# Patient Record
Sex: MALE
[De-identification: names, ages, dates, MRNs, and addresses within clinical notes are randomized per-mention and may not be internally consistent; named-entity substitution may affect disease eponyms.]

---

## 2018-03-16 ENCOUNTER — HOSPITAL ENCOUNTER (INPATIENT)
Dept: HOSPITAL 31 - C.ER | Age: 33
LOS: 4 days | Discharge: HOME | DRG: 750 | End: 2018-03-20
Attending: HOSPITALIST | Admitting: HOSPITALIST
Payer: COMMERCIAL

## 2018-03-16 DIAGNOSIS — D69.6: ICD-10-CM

## 2018-03-16 DIAGNOSIS — R31.29: ICD-10-CM

## 2018-03-16 DIAGNOSIS — K76.0: ICD-10-CM

## 2018-03-16 DIAGNOSIS — M62.82: ICD-10-CM

## 2018-03-16 DIAGNOSIS — G47.00: ICD-10-CM
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
## 2018-03-16 DIAGNOSIS — F10.221: Primary | ICD-10-CM

## 2018-03-16 DIAGNOSIS — F33.1: ICD-10-CM

## 2018-03-16 DIAGNOSIS — E78.5: ICD-10-CM

## 2018-03-16 DIAGNOSIS — E66.9: ICD-10-CM

## 2018-03-16 DIAGNOSIS — F10.231: ICD-10-CM

## 2018-03-17 LAB
ALBUMIN SERPL-MCNC: 4.7 G/DL (ref 3.5–5)
ALBUMIN/GLOB SERPL: 1.2 {RATIO} (ref 1–2.1)
ALT SERPL-CCNC: 287 U/L (ref 21–72)
APAP SERPL-MCNC: < 10 UG/ML (ref 10–30)
AST SERPL-CCNC: 676 U/L (ref 17–59)
BASOPHILS # BLD AUTO: 0 K/UL (ref 0–0.2)
BASOPHILS NFR BLD: 0.2 % (ref 0–2)
BILIRUB UR-MCNC: NEGATIVE MG/DL
BUN SERPL-MCNC: 11 MG/DL (ref 9–20)
CALCIUM SERPL-MCNC: 8.3 MG/DL (ref 8.6–10.4)
EOSINOPHIL # BLD AUTO: 0 K/UL (ref 0–0.7)
EOSINOPHIL NFR BLD: 0 % (ref 0–4)
ERYTHROCYTE [DISTWIDTH] IN BLOOD BY AUTOMATED COUNT: 14.1 % (ref 11.5–14.5)
GFR NON-AFRICAN AMERICAN: > 60
GLUCOSE UR STRIP-MCNC: NORMAL MG/DL
HDLC SERPL-MCNC: 84 MG/DL (ref 30–70)
HGB BLD-MCNC: 14.9 G/DL (ref 12–18)
LDLC SERPL-MCNC: 222 MG/DL (ref 0–129)
LEUKOCYTE ESTERASE UR-ACNC: (no result) LEU/UL
LYMPHOCYTES # BLD AUTO: 1.3 K/UL (ref 1–4.3)
LYMPHOCYTES NFR BLD AUTO: 10.6 % (ref 20–40)
MCH RBC QN AUTO: 30.7 PG (ref 27–31)
MCHC RBC AUTO-ENTMCNC: 34.3 G/DL (ref 33–37)
MCV RBC AUTO: 89.5 FL (ref 80–94)
MONOCYTES # BLD: 0.6 K/UL (ref 0–0.8)
MONOCYTES NFR BLD: 5.2 % (ref 0–10)
NEUTROPHILS # BLD: 10.1 K/UL (ref 1.8–7)
NEUTROPHILS NFR BLD AUTO: 84 % (ref 50–75)
NRBC BLD AUTO-RTO: 0.1 % (ref 0–2)
PH UR STRIP: 6 [PH] (ref 5–8)
PLATELET # BLD: 167 K/UL (ref 130–400)
PMV BLD AUTO: 10 FL (ref 7.2–11.7)
PROT UR STRIP-MCNC: (no result) MG/DL
RBC # BLD AUTO: 4.85 MIL/UL (ref 4.4–5.9)
RBC # UR STRIP: (no result) /UL
SALICYLATE: < 1 MG/DL 1
SP GR UR STRIP: 1.01 (ref 1–1.03)
UROBILINOGEN UR-MCNC: 4 MG/DL (ref 0.2–1)
WBC # BLD AUTO: 12.1 K/UL (ref 4.8–10.8)

## 2018-03-17 PROCEDURE — HZ56ZZZ INDIVIDUAL PSYCHOTHERAPY FOR SUBSTANCE ABUSE TREATMENT, PSYCHOEDUCATION: ICD-10-PCS | Performed by: PSYCHIATRY & NEUROLOGY

## 2018-03-17 PROCEDURE — GZ3ZZZZ MEDICATION MANAGEMENT: ICD-10-PCS | Performed by: PSYCHIATRY & NEUROLOGY

## 2018-03-17 PROCEDURE — HZ2ZZZZ DETOXIFICATION SERVICES FOR SUBSTANCE ABUSE TREATMENT: ICD-10-PCS | Performed by: PSYCHIATRY & NEUROLOGY

## 2018-03-17 PROCEDURE — HZ59ZZZ INDIVIDUAL PSYCHOTHERAPY FOR SUBSTANCE ABUSE TREATMENT, SUPPORTIVE: ICD-10-PCS | Performed by: PSYCHIATRY & NEUROLOGY

## 2018-03-17 RX ADMIN — FOLIC ACID SCH MLS/HR: 5 INJECTION, SOLUTION INTRAMUSCULAR; INTRAVENOUS; SUBCUTANEOUS at 10:09

## 2018-03-17 RX ADMIN — PANTOPRAZOLE SODIUM SCH: 40 TABLET, DELAYED RELEASE ORAL at 10:12

## 2018-03-17 NOTE — PCM.PSYCH
Initial Psychiatric Evaluation





- Initial Psychiatric Evaluation


Chief Complaint (in patient's own words): 





No response to c/c question. He is seen for a consult for alcohol wdw. Medical 

 used.


History of Present Illness and Precipitating Events: 


The pt is seen, chart reviewed and case discussed


He is a very poor historian b/c of his confusion.





He is a 33 y/o LM, single, lives with some friends and works on and off


He drinks more than a pint but in binges. 


Denies drugs


Reports depressive sxs and a suicide attempt last year. Not suicidal now


His alertness comes and goes. He is somewhat oriented to place/time, is 

restless and sweating.


Medical: Obese


Family psych hx: Unknown


Current Medications: 





Active Medications











Generic Name Dose Route Start Last Admin





  Trade Name Freq  PRN Reason Stop Dose Admin


 


Gabapentin  400 mg  03/17/18 14:00  





  Neurontin  PO   





  TID MARTITA   


 


Haloperidol  5 mg  03/17/18 12:20  





  Haldol  PO   





  Q4H PRN   





  Agitation   


 


Folic Acid 1 mg/ Thiamine HCl  1,011.2 mls @ 100 mls/hr  03/17/18 10:00  03/17/ 18 10:09





100 mg/ Multivitamins/Vitamin  IV   100 mls/hr





C 10 ml/ Sodium Chloride  DAILY MARTITA   Administration


 


Ibuprofen  400 mg  03/17/18 07:32  





  Motrin Tab  PO   





  Q6 PRN   





  Fever >100.4 F   


 


Lorazepam  1 mg  03/17/18 08:30  





  Ativan  IVP   





  Q4H PRN   





  Seizure activity   


 


Lorazepam  2 mg  03/17/18 12:30  





  Ativan  IVP  03/21/18 12:29  





  Q6H MARTITA   





  Taper   


 


Lorazepam  1 mg  03/17/18 12:18  





  Ativan  IVP   





  Q3H PRN   





  Breakthru alcohol withdrawal   


 


Ondansetron HCl  4 mg  03/17/18 07:32  





  Zofran Inj  IVP   





  Q6 PRN   





  Nausea/Vomiting   


 


Pantoprazole Sodium  40 mg  03/17/18 10:00  03/17/18 10:12





  Protonix Ec Tab  PO   Not Given





  DAILY MARTITA   














Past Psychiatric History





- Past Psychiatric History


Previous Treatment History: Intensive Outpatient


Pertinent Medical Hx (Current Medical&Sleep Prob, Allergies): 





 Allergies











Allergy/AdvReac Type Severity Reaction Status Date / Time


 


No Known Allergies Allergy   Unverified 03/16/18 23:52














Review of Systems





- Neurological


Neurological: Confusion, Tremor





- Psychiatric


Psychiatric: Abnormal Sleep Pattern, Anxiety, Confusion, Difficulty 

Concentrating, Hallucinations (likely), Irritability.  absent: Homicidal 

Ideation, Suicidal Ideation





Mental Status Examination





- Personal Presentation


Personal Presentation: Looks older than stated age





- Affect


Affect: Blunted (odd)





- Motor Activity


Motor Activity: Other (restless)





- Reliability in Providing Information


Reliability in Providing Information: Poor, due to cognitve impairment





- Speech


Speech: Disorganized





- Mood


Mood: Anxious





- Formal Thought Process


Formal Thought Process: Hallucinations, Loosening of associations





- Cognitive Functions


Orientation: Place ('hospital" Disoriented in other spheres)


Attention/Concentration: Easily distracted


Abstract Thinking: [place]


Estimate of Intelligence: Average


Judgement: Intact, as evidence by: Insight regarding need for hospitalization


Memory: Recent impaired, as evidence by: Inability to recall events of the day





- Risk


Risk: Seizure, Withdrawal, Diminished functioning





- Strength & Assets Inventory


Strength & Assets Inventory: Life experience, Cooperative





- Limitations


Limitations: Living alone





DSM 5 DX





- DSM 5


DSM 5 Diagnosis: 





Alcohol withdrawal delirium


Alcohol use d/o - severe


MAjor depression, recurrent, moderate





- Recommended/Plan of Treatment


Treatment Recommendations and Plan of Treatment: 





Ativan detox


Remeron for depression, insomnia


As needed medications


Gabapentin for augmentation


All risks, benefits and alternatives of medications, including no medications, 

discussed and the patient understood


Supportive therapy and psychoeducation


MI for abstinence


CBT for relapse prevention


Encourage MAT


Refer to rehab or IOP/AA


   


34 min

## 2018-03-17 NOTE — CP.PCM.HP
<Liu,Weilin - Last Filed: 03/17/18 13:40>





History of Present Illness





- History of Present Illness


History of Present Illness: 





CC: Alcohol withdraw





32 year old  male with history of alcohol abuse was brought to the ED 

by his brother for alcohol intoxication. Patient is intoxicated and unable to 

provide meaning history. Per brother and sister in law over the phone, patient 

is a chronic alcohol abuser with no other medical history. Patient has been 

drinking for the past 4 days. Relatives unable to verified if patient injured 

himself during intoxication. Unable to obtain further ROS due to patient's 

condition.





PMHx: alcohol abuse


PSHx: none


Allergy: NKDA


Social Hx: alcohol, denies tobacco or other drug use


Family Hx: non contributory


Home meds: none








Present on Admission





- Present on Admission


Any Indicators Present on Admission: No





Review of Systems





- Review of Systems


Systems not reviewed;Unavailable: Intoxicated, Uncooperative





- Constitutional


Constitutional: As Per HPI





- EENT


Eyes: As Per HPI


Ears: As Per HPI


Nose/Mouth/Throat: As Per HPI





- Cardiovascular


Cardiovascular: As Per HPI





- Respiratory


Respiratory: As Per HPI





- Gastrointestinal


Gastrointestinal: As Per HPI





- Genitourinary


Genitourinary: As Per HPI





- Reproductive: Male


Reproductive:Male: As Per HPI





- Musculoskeletal


Musculoskeletal: As Per HPI





- Integumentary


Integumentary: As Per HPI





- Neurological


Neurological: As Per HPI


Additional comments: 





Patient is restless, unable to follow verbal commands





- Psychiatric


Psychiatric: As Per HPI





- Endocrine


Endocrine: As Per HPI





- Hematologic/Lymphatic


Hematologic: As Per HPI





Past Patient History





- Past Social History


Smoking Status: Never Smoked





- PSYCHIATRIC


Hx Substance Use: No





- SURGICAL HISTORY


Hx Surgeries: No





Meds


Allergies/Adverse Reactions: 


 Allergies











Allergy/AdvReac Type Severity Reaction Status Date / Time


 


No Known Allergies Allergy   Unverified 03/16/18 23:52














Physical Exam





- Constitutional


Appears: No Acute Distress, Unkempt





- ENT Exam


ENT Exam: Mucous Membranes Moist, Normal Exam





- Neck Exam


Neck exam: Positive for: Normal Inspection





- Respiratory Exam


Respiratory Exam: Clear to Auscultation Bilateral, NORMAL BREATHING PATTERN.  

absent: Respiratory Distress





- Cardiovascular Exam


Cardiovascular Exam: REGULAR RHYTHM, +S1, +S2





- GI/Abdominal Exam


GI & Abdominal Exam: Normal Bowel Sounds, Soft.  absent: Tenderness





- Extremities Exam


Additional comments: 





left arm ecchymosis





- Neurological Exam


Additional comments: 





Patient does not follow verbal commands, tries to climb out of the bed.





- Skin


Skin Exam: Dry, Warm





Results





- Vital Signs


Recent Vital Signs: 





 Last Vital Signs











Temp  97 F L  03/16/18 23:49


 


Pulse  102 H  03/17/18 06:44


 


Resp  23   03/17/18 06:44


 


BP  157/85 H  03/17/18 06:44


 


Pulse Ox  96   03/17/18 07:15














- Labs


Result Diagrams: 


 03/17/18 00:36





 03/17/18 00:36


Labs: 





 Laboratory Results - last 24 hr











  03/17/18 03/17/18 03/17/18





  00:36 00:36 00:36


 


WBC  12.1 H  


 


RBC  4.85  


 


Hgb  14.9  


 


Hct  43.4  


 


MCV  89.5  


 


MCH  30.7  


 


MCHC  34.3  


 


RDW  14.1  


 


Plt Count  167  


 


MPV  10.0  


 


Neut % (Auto)  84.0 H  


 


Lymph % (Auto)  10.6 L  


 


Mono % (Auto)  5.2  


 


Eos % (Auto)  0.0  


 


Baso % (Auto)  0.2  


 


Neut # (Auto)  10.1 H  


 


Lymph # (Auto)  1.3  


 


Mono # (Auto)  0.6  


 


Eos # (Auto)  0.0  


 


Baso # (Auto)  0.0  


 


Sodium   141 


 


Potassium   3.5 L 


 


Chloride   95 L 


 


Carbon Dioxide   22 


 


Anion Gap   27 H 


 


BUN   11 


 


Creatinine   0.6 L 


 


Est GFR ( Amer)   > 60 


 


Est GFR (Non-Af Amer)   > 60 


 


POC Glucose (mg/dL)   


 


Random Glucose   130 H 


 


Calcium   8.3 L 


 


Total Bilirubin   2.0 H 


 


AST   676 H 


 


ALT   287 H 


 


Alkaline Phosphatase   151 H 


 


Ammonia    33


 


Total Protein   8.7 H 


 


Albumin   4.7 


 


Globulin   4.0 H 


 


Albumin/Globulin Ratio   1.2 


 


Urine Color   


 


Urine Clarity   


 


Urine pH   


 


Ur Specific Gravity   


 


Urine Protein   


 


Urine Glucose (UA)   


 


Urine Ketones   


 


Urine Blood   


 


Urine Nitrate   


 


Urine Bilirubin   


 


Urine Urobilinogen   


 


Ur Leukocyte Esterase   


 


Urine RBC (Auto)   


 


Salicylates   


 


Urine Opiates Screen   


 


Urine Methadone Screen   


 


Acetaminophen   


 


Ur Barbiturates Screen   


 


Ur Phencyclidine Scrn   


 


Ur Amphetamines Screen   


 


U Benzodiazepines Scrn   


 


U Oth Cocaine Metabols   


 


U Cannabinoids Screen   


 


Alcohol, Quantitative   383 H 














  03/17/18 03/17/18 03/17/18





  00:37 00:45 03:02


 


WBC   


 


RBC   


 


Hgb   


 


Hct   


 


MCV   


 


MCH   


 


MCHC   


 


RDW   


 


Plt Count   


 


MPV   


 


Neut % (Auto)   


 


Lymph % (Auto)   


 


Mono % (Auto)   


 


Eos % (Auto)   


 


Baso % (Auto)   


 


Neut # (Auto)   


 


Lymph # (Auto)   


 


Mono # (Auto)   


 


Eos # (Auto)   


 


Baso # (Auto)   


 


Sodium   


 


Potassium   


 


Chloride   


 


Carbon Dioxide   


 


Anion Gap   


 


BUN   


 


Creatinine   


 


Est GFR ( Amer)   


 


Est GFR (Non-Af Amer)   


 


POC Glucose (mg/dL)  109  


 


Random Glucose   


 


Calcium   


 


Total Bilirubin   


 


AST   


 


ALT   


 


Alkaline Phosphatase   


 


Ammonia   


 


Total Protein   


 


Albumin   


 


Globulin   


 


Albumin/Globulin Ratio   


 


Urine Color    Yellow


 


Urine Clarity    Clear


 


Urine pH    6.0


 


Ur Specific Gravity    1.013


 


Urine Protein    2+ H


 


Urine Glucose (UA)    Normal


 


Urine Ketones    2+ H


 


Urine Blood    3+ H


 


Urine Nitrate    Negative


 


Urine Bilirubin    Negative


 


Urine Urobilinogen    4.0


 


Ur Leukocyte Esterase    Neg


 


Urine RBC (Auto)    27 H


 


Salicylates   < 1.0 


 


Urine Opiates Screen   


 


Urine Methadone Screen   


 


Acetaminophen   < 10.0 L 


 


Ur Barbiturates Screen   


 


Ur Phencyclidine Scrn   


 


Ur Amphetamines Screen   


 


U Benzodiazepines Scrn   


 


U Oth Cocaine Metabols   


 


U Cannabinoids Screen   


 


Alcohol, Quantitative   














  03/17/18





  03:02


 


WBC 


 


RBC 


 


Hgb 


 


Hct 


 


MCV 


 


MCH 


 


MCHC 


 


RDW 


 


Plt Count 


 


MPV 


 


Neut % (Auto) 


 


Lymph % (Auto) 


 


Mono % (Auto) 


 


Eos % (Auto) 


 


Baso % (Auto) 


 


Neut # (Auto) 


 


Lymph # (Auto) 


 


Mono # (Auto) 


 


Eos # (Auto) 


 


Baso # (Auto) 


 


Sodium 


 


Potassium 


 


Chloride 


 


Carbon Dioxide 


 


Anion Gap 


 


BUN 


 


Creatinine 


 


Est GFR ( Amer) 


 


Est GFR (Non-Af Amer) 


 


POC Glucose (mg/dL) 


 


Random Glucose 


 


Calcium 


 


Total Bilirubin 


 


AST 


 


ALT 


 


Alkaline Phosphatase 


 


Ammonia 


 


Total Protein 


 


Albumin 


 


Globulin 


 


Albumin/Globulin Ratio 


 


Urine Color 


 


Urine Clarity 


 


Urine pH 


 


Ur Specific Gravity 


 


Urine Protein 


 


Urine Glucose (UA) 


 


Urine Ketones 


 


Urine Blood 


 


Urine Nitrate 


 


Urine Bilirubin 


 


Urine Urobilinogen 


 


Ur Leukocyte Esterase 


 


Urine RBC (Auto) 


 


Salicylates 


 


Urine Opiates Screen  Negative


 


Urine Methadone Screen  Negative


 


Acetaminophen 


 


Ur Barbiturates Screen  Negative


 


Ur Phencyclidine Scrn  Negative


 


Ur Amphetamines Screen  Negative


 


U Benzodiazepines Scrn  Negative


 


U Oth Cocaine Metabols  Negative


 


U Cannabinoids Screen  Negative


 


Alcohol, Quantitative 














Assessment & Plan





- Assessment and Plan (Free Text)


Assessment: 





Alcohol intoxication


-Banana bag IV 100ml/hr


-Ativan IV 1mg Q4hr prn


-Follow up hepatitis panel, CPK


-1:1 observation


-[facility]


-Head CT shows no evidence of intracranial mass or hemorrhage


-Psychiatry consult, Dr. Clements help appreciated





Prophylactic measures


-Protonix


-SCD


-Zofran


-Motrin





<Geoff Gooden - Last Filed: 03/17/18 15:12>





Results





- Vital Signs


Recent Vital Signs: 





 Last Vital Signs











Temp  97.5 F L  03/17/18 08:01


 


Pulse  112 H  03/17/18 12:53


 


Resp  20   03/17/18 08:01


 


BP  130/67   03/17/18 08:01


 


Pulse Ox  99   03/17/18 08:01














- Labs


Result Diagrams: 


 03/17/18 00:36





 03/17/18 00:36


Labs: 





 Laboratory Results - last 24 hr











  03/17/18 03/17/18 03/17/18





  00:36 00:36 00:36


 


WBC  12.1 H  


 


RBC  4.85  


 


Hgb  14.9  


 


Hct  43.4  


 


MCV  89.5  


 


MCH  30.7  


 


MCHC  34.3  


 


RDW  14.1  


 


Plt Count  167  


 


MPV  10.0  


 


Neut % (Auto)  84.0 H  


 


Lymph % (Auto)  10.6 L  


 


Mono % (Auto)  5.2  


 


Eos % (Auto)  0.0  


 


Baso % (Auto)  0.2  


 


Neut # (Auto)  10.1 H  


 


Lymph # (Auto)  1.3  


 


Mono # (Auto)  0.6  


 


Eos # (Auto)  0.0  


 


Baso # (Auto)  0.0  


 


Sodium   141 


 


Potassium   3.5 L 


 


Chloride   95 L 


 


Carbon Dioxide   22 


 


Anion Gap   27 H 


 


BUN   11 


 


Creatinine   0.6 L 


 


Est GFR ( Amer)   > 60 


 


Est GFR (Non-Af Amer)   > 60 


 


POC Glucose (mg/dL)   


 


Random Glucose   130 H 


 


Calcium   8.3 L 


 


Total Bilirubin   2.0 H 


 


AST   676 H 


 


ALT   287 H 


 


Alkaline Phosphatase   151 H 


 


Ammonia    33


 


Total Creatine Kinase   6715 H 


 


Total Protein   8.7 H 


 


Albumin   4.7 


 


Globulin   4.0 H 


 


Albumin/Globulin Ratio   1.2 


 


Triglycerides   354 H 


 


Cholesterol   349 H 


 


LDL Cholesterol Direct   222 H 


 


HDL Cholesterol   84 H 


 


TSH 3rd Generation   0.47 


 


Urine Color   


 


Urine Clarity   


 


Urine pH   


 


Ur Specific Gravity   


 


Urine Protein   


 


Urine Glucose (UA)   


 


Urine Ketones   


 


Urine Blood   


 


Urine Nitrate   


 


Urine Bilirubin   


 


Urine Urobilinogen   


 


Ur Leukocyte Esterase   


 


Urine RBC (Auto)   


 


Salicylates   


 


Urine Opiates Screen   


 


Urine Methadone Screen   


 


Acetaminophen   


 


Ur Barbiturates Screen   


 


Ur Phencyclidine Scrn   


 


Ur Amphetamines Screen   


 


U Benzodiazepines Scrn   


 


U Oth Cocaine Metabols   


 


U Cannabinoids Screen   


 


Alcohol, Quantitative   383 H 














  03/17/18 03/17/18 03/17/18





  00:37 00:45 03:02


 


WBC   


 


RBC   


 


Hgb   


 


Hct   


 


MCV   


 


MCH   


 


MCHC   


 


RDW   


 


Plt Count   


 


MPV   


 


Neut % (Auto)   


 


Lymph % (Auto)   


 


Mono % (Auto)   


 


Eos % (Auto)   


 


Baso % (Auto)   


 


Neut # (Auto)   


 


Lymph # (Auto)   


 


Mono # (Auto)   


 


Eos # (Auto)   


 


Baso # (Auto)   


 


Sodium   


 


Potassium   


 


Chloride   


 


Carbon Dioxide   


 


Anion Gap   


 


BUN   


 


Creatinine   


 


Est GFR ( Amer)   


 


Est GFR (Non-Af Amer)   


 


POC Glucose (mg/dL)  109  


 


Random Glucose   


 


Calcium   


 


Total Bilirubin   


 


AST   


 


ALT   


 


Alkaline Phosphatase   


 


Ammonia   


 


Total Creatine Kinase   


 


Total Protein   


 


Albumin   


 


Globulin   


 


Albumin/Globulin Ratio   


 


Triglycerides   


 


Cholesterol   


 


LDL Cholesterol Direct   


 


HDL Cholesterol   


 


TSH 3rd Generation   


 


Urine Color    Yellow


 


Urine Clarity    Clear


 


Urine pH    6.0


 


Ur Specific Gravity    1.013


 


Urine Protein    2+ H


 


Urine Glucose (UA)    Normal


 


Urine Ketones    2+ H


 


Urine Blood    3+ H


 


Urine Nitrate    Negative


 


Urine Bilirubin    Negative


 


Urine Urobilinogen    4.0


 


Ur Leukocyte Esterase    Neg


 


Urine RBC (Auto)    27 H


 


Salicylates   < 1.0 


 


Urine Opiates Screen   


 


Urine Methadone Screen   


 


Acetaminophen   < 10.0 L 


 


Ur Barbiturates Screen   


 


Ur Phencyclidine Scrn   


 


Ur Amphetamines Screen   


 


U Benzodiazepines Scrn   


 


U Oth Cocaine Metabols   


 


U Cannabinoids Screen   


 


Alcohol, Quantitative   














  03/17/18





  03:02


 


WBC 


 


RBC 


 


Hgb 


 


Hct 


 


MCV 


 


MCH 


 


MCHC 


 


RDW 


 


Plt Count 


 


MPV 


 


Neut % (Auto) 


 


Lymph % (Auto) 


 


Mono % (Auto) 


 


Eos % (Auto) 


 


Baso % (Auto) 


 


Neut # (Auto) 


 


Lymph # (Auto) 


 


Mono # (Auto) 


 


Eos # (Auto) 


 


Baso # (Auto) 


 


Sodium 


 


Potassium 


 


Chloride 


 


Carbon Dioxide 


 


Anion Gap 


 


BUN 


 


Creatinine 


 


Est GFR ( Amer) 


 


Est GFR (Non-Af Amer) 


 


POC Glucose (mg/dL) 


 


Random Glucose 


 


Calcium 


 


Total Bilirubin 


 


AST 


 


ALT 


 


Alkaline Phosphatase 


 


Ammonia 


 


Total Creatine Kinase 


 


Total Protein 


 


Albumin 


 


Globulin 


 


Albumin/Globulin Ratio 


 


Triglycerides 


 


Cholesterol 


 


LDL Cholesterol Direct 


 


HDL Cholesterol 


 


TSH 3rd Generation 


 


Urine Color 


 


Urine Clarity 


 


Urine pH 


 


Ur Specific Gravity 


 


Urine Protein 


 


Urine Glucose (UA) 


 


Urine Ketones 


 


Urine Blood 


 


Urine Nitrate 


 


Urine Bilirubin 


 


Urine Urobilinogen 


 


Ur Leukocyte Esterase 


 


Urine RBC (Auto) 


 


Salicylates 


 


Urine Opiates Screen  Negative


 


Urine Methadone Screen  Negative


 


Acetaminophen 


 


Ur Barbiturates Screen  Negative


 


Ur Phencyclidine Scrn  Negative


 


Ur Amphetamines Screen  Negative


 


U Benzodiazepines Scrn  Negative


 


U Oth Cocaine Metabols  Negative


 


U Cannabinoids Screen  Negative


 


Alcohol, Quantitative 














Attending/Attestation





- Attestation


I have personally seen and examined this patient.: Yes


I have fully participated in the care of the patient.: Yes


I have reviewed all pertinent clinical information: Yes


Notes (Text): 


Patient was seen and examined. He was sedated after ativan. Not able to get 

history. Johannynet was agitated at ER. History taken form his family


Patient is arousable and moving all 4 extremities. CT head negative for bleed/

pathology


Has elevated LFT likely due to alcohol.normal ammonia level.Mild 

leukocytosis.likely reactive leukocytosis.no fever,clear chest x ashley. High 

alcohol level 383


1. Alcohol withdrawal/alcohol abuse


2. Transaminitis


3. Hyperlipidemia


4.Leukocytosis


continue banana bag. Ativan as needed.Get US abdomen,follow LFT,seizure and 

aspiration precaution.GI and DVT prophylaxis. psychiatry consult appreciated


discussed with the resident. I agree with the resident's documentation of the 

assessment and the plan

## 2018-03-17 NOTE — RAD
PROCEDURE:  CHEST RADIOGRAPH, 1 VIEW



HISTORY:

Detox/Psy



COMPARISON:

None available.



FINDINGS:



LUNGS:

Clear.



PLEURA:

No pneumothorax or pleural fluid seen.



CARDIOVASCULAR:

Normal.



OSSEOUS STRUCTURES:

No significant abnormalities.



VISUALIZED UPPER ABDOMEN:

Normal.



OTHER FINDINGS:

None. 



IMPRESSION:

No active disease.

## 2018-03-17 NOTE — C.PDOC
History Of Present Illness


32 year old male presents to the ER by brother after patient was noted to be on 

a 3 day drinking binge. As per brother, patient is living with 3 other guys who 

state he has been acting weird. Brother states patient has a Hx of ETOH abuse 

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 

works less in the winter due to weather. 


Time Seen by Provider: 03/17/18 00:02


Chief Complaint (Nursing): Substance Abuse


History Per: Family


History/Exam Limitations: intoxication


Onset/Duration Of Symptoms: Days


Current Symptoms Are (Timing): Still Present


Suicide/Self Injury Attempted (Context): None


Modifying Factor(s): Alcohol


Associated Symptoms: denies: Depression, Suicidal Thoughts


Involuntary Hold By: None


Recent travel outside of the [place]: No





Past Medical History


Reviewed: Historical Data, Nursing Documentation, Vital Signs


Vital Signs: 


 Last Vital Signs











Temp  97 F L  03/16/18 23:49


 


Pulse  98 H  03/17/18 05:30


 


Resp  23   03/17/18 05:30


 


BP  138/84   03/17/18 05:30


 


Pulse Ox  96   03/17/18 05:30











Family History: States: Unknown Family Hx





- Social History


Hx Alcohol Use: Yes


Hx Substance Use: No





- Immunization History


Hx Tetanus Toxoid Vaccination: No


Hx Influenza Vaccination: No


Hx Pneumococcal Vaccination: No





Review Of Systems


Review Of Systems: ROS cannot be obtained secondary to pt's inabilty to answer 

questions.





Physical Exam





- Physical Exam


Appears: Non-toxic, Other (Obese  male, ETOH on breath, not answering; 

lays down on floor, gets up, walks around, lays down again, approximately every 

60 seconds.)


Skin: Warm, Dry


Head: Atraumatic, Normacephalic


Eye(s): bilateral: Normal Inspection


Oral Mucosa: Moist


Chest: Symmetrical, No Tenderness


Cardiovascular: Rhythm Regular


Respiratory: Normal Breath Sounds, No Rales, No Rhonchi, No Wheezing


Gastrointestinal/Abdominal: Soft, No Tenderness


Extremity: Other (Ecchymosis to right proximal elbow area)


Neurological/Psych: Oriented x3, Normal Speech





ED Course And Treatment





- Laboratory Results


Result Diagrams: 


 03/17/18 00:36





 03/17/18 00:36


O2 Sat by Pulse Oximetry: 96 (Room air)


Pulse Ox Interpretation: Normal


Progress Note: CT head, blood work, urinalysis, EKG, CXR ordered. IV fluids 

administered.


Reevaluation Time: 06:00


Reassessment Condition: Improved





- Physician Consult Information


Outcome Of Conversation: 0600: d/w bertrand Porter to admit.





Disposition


Doctor Will See Patient In The: Hospital


Counseled Patient/Family Regarding: Studies Performed, Diagnosis





- Disposition


Disposition: HOSPITALIZED


Disposition Time: 07:14


Condition: FAIR





- Clinical Impression


Clinical Impression: 


 Alcohol dependence








- Scribe Statement


The provider has reviewed the documentation as recorded by the Scribmichael Marshall





All medical record entries made by the Rozibmichael were at my direction and 

personally dictated by me. I have reviewed the chart and agree that the record 

accurately reflects my personal performance of the history, physical exam, 

medical decision making, and the department course for this patient. I have 

also personally directed, reviewed, and agree with the discharge instructions 

and disposition.

## 2018-03-17 NOTE — CT
PROCEDURE:  CT HEAD WITHOUT CONTRAST.



HISTORY:

AMS, alcohol intoxication, poss trauma



COMPARISON:

None available. 



TECHNIQUE:

Axial computed tomography images were obtained through the head/brain 

without intravenous contrast.  



Radiation dose:



Total exam DLP = 1006.54 mGy-cm.



This CT exam was performed using one or more of the following dose 

reduction techniques: Automated exposure control, adjustment of the 

mA and/or kV according to patient size, and/or use of iterative 

reconstruction technique.



FINDINGS:



HEMORRHAGE:

No intracranial hemorrhage. 



BRAIN:

No mass effect or edema.  No atrophy or chronic microvascular 

ischemic changes.



VENTRICLES:

Unremarkable. No hydrocephalus. 



CALVARIUM:

Unremarkable.



PARANASAL SINUSES:

Small left maxillary retention cyst/polyp.



MASTOID AIR CELLS:

Unremarkable as visualized. No inflammatory changes.



OTHER FINDINGS:

None.



IMPRESSION:

No intracranial mass, hemorrhage or evidence of acute infarct.

## 2018-03-18 LAB
ALBUMIN SERPL-MCNC: 4.3 G/DL (ref 3.5–5)
ALBUMIN/GLOB SERPL: 1.2 {RATIO} (ref 1–2.1)
ALT SERPL-CCNC: 215 U/L (ref 21–72)
APTT BLD: 30 SECONDS (ref 21–34)
AST SERPL-CCNC: 297 U/L (ref 17–59)
BASOPHILS # BLD AUTO: 0 K/UL (ref 0–0.2)
BASOPHILS NFR BLD: 0.2 % (ref 0–2)
BILIRUB UR-MCNC: NEGATIVE MG/DL
BUN SERPL-MCNC: 7 MG/DL (ref 9–20)
CALCIUM SERPL-MCNC: 8.6 MG/DL (ref 8.6–10.4)
EOSINOPHIL # BLD AUTO: 0 K/UL (ref 0–0.7)
EOSINOPHIL NFR BLD: 0.5 % (ref 0–4)
ERYTHROCYTE [DISTWIDTH] IN BLOOD BY AUTOMATED COUNT: 14.2 % (ref 11.5–14.5)
GFR NON-AFRICAN AMERICAN: > 60
GLUCOSE UR STRIP-MCNC: (no result) MG/DL
HGB BLD-MCNC: 13.8 G/DL (ref 12–18)
INR PPP: 1
LEUKOCYTE ESTERASE UR-ACNC: (no result) LEU/UL
LYMPHOCYTES # BLD AUTO: 0.9 K/UL (ref 1–4.3)
LYMPHOCYTES NFR BLD AUTO: 12.5 % (ref 20–40)
MCH RBC QN AUTO: 31.1 PG (ref 27–31)
MCHC RBC AUTO-ENTMCNC: 34.6 G/DL (ref 33–37)
MCV RBC AUTO: 89.8 FL (ref 80–94)
MONOCYTES # BLD: 0.3 K/UL (ref 0–0.8)
MONOCYTES NFR BLD: 4 % (ref 0–10)
NEUTROPHILS # BLD: 6.2 K/UL (ref 1.8–7)
NEUTROPHILS NFR BLD AUTO: 82.8 % (ref 50–75)
NRBC BLD AUTO-RTO: 0.2 % (ref 0–2)
PH UR STRIP: 6 [PH] (ref 5–8)
PLATELET # BLD: 107 K/UL (ref 130–400)
PMV BLD AUTO: 10.6 FL (ref 7.2–11.7)
PROT UR STRIP-MCNC: (no result) MG/DL
PROTHROMBIN TIME: 10.9 SECONDS (ref 9.7–12.2)
RBC # BLD AUTO: 4.43 MIL/UL (ref 4.4–5.9)
RBC # UR STRIP: (no result) /UL
SP GR UR STRIP: 1.01 (ref 1–1.03)
UROBILINOGEN UR-MCNC: NORMAL MG/DL (ref 0.2–1)
WBC # BLD AUTO: 7.5 K/UL (ref 4.8–10.8)

## 2018-03-18 RX ADMIN — PANTOPRAZOLE SODIUM SCH MG: 40 TABLET, DELAYED RELEASE ORAL at 09:14

## 2018-03-18 RX ADMIN — POTASSIUM CHLORIDE SCH MEQ: 20 TABLET, EXTENDED RELEASE ORAL at 15:02

## 2018-03-18 RX ADMIN — FOLIC ACID SCH MLS/HR: 5 INJECTION, SOLUTION INTRAMUSCULAR; INTRAVENOUS; SUBCUTANEOUS at 09:14

## 2018-03-18 NOTE — CP.PCM.PN
<Liu,Weilin - Last Filed: 03/18/18 15:15>





Subjective





- Date & Time of Evaluation


Date of Evaluation: 03/18/18


Time of Evaluation: 08:15





- Subjective


Subjective: 





Patient seen and examined at bedside. Patient has been restless all night. 

Currently patient is able to answer simple questions with long pauses. He is 

unable to answer how he got the bruises on his body. Patient denies pain 

anywhere in the body. He further denies headache, dizziness, shortness of breath

, chest pain, nausea, vomiting.





Objective





- Vital Signs/Intake and Output


Vital Signs (last 24 hours): 


 











Temp Pulse Resp BP Pulse Ox


 


 97.6 F   125 H  20   133/89   97 


 


 03/18/18 04:31  03/18/18 04:31  03/18/18 04:31  03/18/18 04:31  03/18/18 04:31








Intake and Output: 


 











 03/18/18 03/18/18





 06:59 18:59


 


Intake Total 740 


 


Output Total 2000 


 


Balance -1260 














- Medications


Medications: 


 Current Medications





Folic Acid (Folic Acid)  1 mg PO DAILY [facility]


Gabapentin (Neurontin)  400 mg PO TID [facility]


   Last Admin: 03/18/18 09:15 Dose:  400 mg


Haloperidol (Haldol)  5 mg PO Q4H PRN


   PRN Reason: Agitation


Haloperidol Lactate (Haldol)  5 mg IVP Q6H PRN


   PRN Reason: severe agitation


Sodium Chloride (Sodium Chloride 0.9%)  1,000 mls @ 100 mls/hr IV .Q10H [facility]


Ibuprofen (Motrin Tab)  400 mg PO Q6 PRN


   PRN Reason: Fever >100.4 F


Lorazepam (Ativan)  2 mg IVP Q6H [facility]


   PRN Reason: Taper


   Stop: 03/21/18 12:29


   Last Admin: 03/18/18 06:34 Dose:  2 mg


Lorazepam (Ativan)  1 mg IVP Q3H PRN


   PRN Reason: Breakthru alcohol withdrawal


   Last Admin: 03/17/18 13:56 Dose:  1 mg


Lorazepam (Ativan)  2 mg IVP Q4H PRN


   PRN Reason: Seizure activity


Mirtazapine (Remeron)  15 mg PO HS [facility]


Ondansetron HCl (Zofran Inj)  4 mg IVP Q6 PRN


   PRN Reason: Nausea/Vomiting


Pantoprazole Sodium (Protonix Ec Tab)  40 mg PO DAILY [facility]


   Last Admin: 03/18/18 09:14 Dose:  40 mg


Pneumococcal Polyvalent Vaccine (Pneumovax 23 Vaccine)  0.5 ml IM .ONCE ONE


   Stop: 03/19/18 10:01


Thiamine HCl (Vitamin B1 Tab)  100 mg PO DAILY [facility]











- Labs


Labs: 


 





 03/18/18 08:26 





 03/18/18 08:26 











- Constitutional


Appears: No Acute Distress, Confused





- Head Exam


Head Exam: ATRAUMATIC, NORMOCEPHALIC





- Eye Exam


Eye Exam: Normal appearance





- ENT Exam


ENT Exam: Mucous Membranes Moist





- Neck Exam


Neck Exam: Normal Inspection





- Respiratory Exam


Respiratory Exam: Clear to Ausculation Bilateral, NORMAL BREATHING PATTERN.  

absent: Respiratory Distress





- Cardiovascular Exam


Cardiovascular Exam: Tachycardia, +S1, +S2





- GI/Abdominal Exam


GI & Abdominal Exam: Soft, Normal Bowel Sounds





- Neurological Exam


Neurological Exam: Awake.  absent: Oriented x3 (orient to place)





- Psychiatric Exam


Psychiatric exam: Flat Affect





- Skin


Additional comments: 





ecchymosis located at medial left arm, bilateral forearm, and left flank





Assessment and Plan





- Assessment and Plan (Free Text)


Assessment: 





Alcohol intoxication


-NS @100ml/hr


-PO folic acid and B1


-Ativan IV 1mg Q4hr prn


-Follow up hepatitis panel, CPK


-1:1 observation


-CIWA


-Head CT shows no evidence of intracranial mass or hemorrhage


-Psychiatry consult, Dr. Clements help appreciated





Transaminitis


-Follow up abdominal US


-Hepatitis panel pending


-AST/ALT today 293/215





Rhabdomyolysis


-NS @100ml/hr


-improving CPK 4029, 2589 today





Hematuria


-Light red color urine this afternoon


-Repeat UA showed RBC 3025


-Urology consult, Dr. Schulte help appreciated





Hyperlipidemia


-, Chol 349, , HDL 84


-Will start med once  LFT and rhabdomyolysis improved





Prophylactic measures


-Protonix


-SCD


-Zofran


-Motrin





Case discussed with Dr. Gooden





<Geoff Gooden - Last Filed: 03/18/18 18:08>





Objective





- Vital Signs/Intake and Output


Vital Signs (last 24 hours): 


 











Temp Pulse Resp BP Pulse Ox


 


 99.7 F H  110 H  18   145/101 H  100 


 


 03/18/18 16:00  03/18/18 16:07  03/18/18 16:00  03/18/18 16:00  03/18/18 16:00








Intake and Output: 


 











 03/18/18 03/18/18





 06:59 18:59


 


Intake Total 740 


 


Output Total 2000 


 


Balance -1260 














- Medications


Medications: 


 Current Medications





Folic Acid (Folic Acid)  1 mg PO DAILY [facility]


Gabapentin (Neurontin)  400 mg PO TID [facility]


   Last Admin: 03/18/18 17:14 Dose:  400 mg


Haloperidol (Haldol)  5 mg PO Q4H PRN


   PRN Reason: Agitation


Haloperidol Lactate (Haldol)  5 mg IVP Q6H PRN


   PRN Reason: severe agitation


Sodium Chloride (Sodium Chloride 0.9%)  1,000 mls @ 100 mls/hr IV .Q10H [facility]


   Last Admin: 03/18/18 14:11 Dose:  100 mls/hr


Ibuprofen (Motrin Tab)  400 mg PO Q6 PRN


   PRN Reason: Fever >100.4 F


Lorazepam (Ativan)  2 mg IVP Q8H [facility]


   PRN Reason: Taper


   Stop: 03/21/18 12:29


   Last Admin: 03/18/18 14:11 Dose:  2 mg


Lorazepam (Ativan)  1 mg IVP Q3H PRN


   PRN Reason: Breakthru alcohol withdrawal


   Last Admin: 03/17/18 13:56 Dose:  1 mg


Lorazepam (Ativan)  2 mg IVP Q4H PRN


   PRN Reason: Seizure activity


Mirtazapine (Remeron)  15 mg PO HS [facility]


Ondansetron HCl (Zofran Inj)  4 mg IVP Q6 PRN


   PRN Reason: Nausea/Vomiting


Pantoprazole Sodium (Protonix Ec Tab)  40 mg PO DAILY [facility]


   Last Admin: 03/18/18 09:14 Dose:  40 mg


Pneumococcal Polyvalent Vaccine (Pneumovax 23 Vaccine)  0.5 ml IM .ONCE ONE


   Stop: 03/19/18 10:01


Potassium Chloride (K-Dur 20 Meq Er Tab)  40 meq PO DAILY [facility]


   Last Admin: 03/18/18 15:02 Dose:  40 meq


Thiamine HCl (Vitamin B1 Tab)  100 mg PO DAILY [facility]











- Labs


Labs: 


 





 03/18/18 08:26 





 03/18/18 08:26 











Attending/Attestation





- Attestation


I have personally seen and examined this patient.: Yes


I have fully participated in the care of the patient.: Yes


I have reviewed all pertinent clinical information, including history, physical 

exam and plan: Yes


Notes (Text): 


Patient was seen and examined. Has no complain. less confused and less agitated 

than yesterday


CPK is coming down. has old ecchymosis. Doesn't know how he got it. He denies 

abdominal pain,no


Noted having gross hematuria.UA shows No nitrate,no leukocyte esterase.continue 

IV hydration


Abdominal US pending. monitor LFT and platelet count,Do PTT and INR


Ecchymosis and hematuria may be due to platelet dysfunction due to alcohol 

intoxication and cirrhosis


d/w the  resident. I agree with the documentation of the resident's assessment 

and the plan


d/w patient's sister at bed side yesterday. Patient lives alone.

## 2018-03-19 VITALS — RESPIRATION RATE: 20 BRPM

## 2018-03-19 LAB
ALBUMIN SERPL-MCNC: 3.9 G/DL (ref 3.5–5)
ALBUMIN/GLOB SERPL: 1.1 {RATIO} (ref 1–2.1)
ALT SERPL-CCNC: 196 U/L (ref 21–72)
AST SERPL-CCNC: 189 U/L (ref 17–59)
BASOPHILS # BLD AUTO: 0 K/UL (ref 0–0.2)
BASOPHILS NFR BLD: 0.3 % (ref 0–2)
BUN SERPL-MCNC: 6 MG/DL (ref 9–20)
CALCIUM SERPL-MCNC: 8.7 MG/DL (ref 8.6–10.4)
EOSINOPHIL # BLD AUTO: 0.1 K/UL (ref 0–0.7)
EOSINOPHIL NFR BLD: 2.3 % (ref 0–4)
ERYTHROCYTE [DISTWIDTH] IN BLOOD BY AUTOMATED COUNT: 14.1 % (ref 11.5–14.5)
GFR NON-AFRICAN AMERICAN: > 60
HEPATITIS A IGM: NEGATIVE
HEPATITIS B CORE AB: NEGATIVE
HEPATITIS C ANTIBODY: NEGATIVE
HGB BLD-MCNC: 13.7 G/DL (ref 12–18)
INR PPP: 1
LYMPHOCYTES # BLD AUTO: 1.2 K/UL (ref 1–4.3)
LYMPHOCYTES NFR BLD AUTO: 19.9 % (ref 20–40)
MCH RBC QN AUTO: 30.9 PG (ref 27–31)
MCHC RBC AUTO-ENTMCNC: 34.4 G/DL (ref 33–37)
MCV RBC AUTO: 89.9 FL (ref 80–94)
MONOCYTES # BLD: 0.3 K/UL (ref 0–0.8)
MONOCYTES NFR BLD: 5.4 % (ref 0–10)
NEUTROPHILS # BLD: 4.3 K/UL (ref 1.8–7)
NEUTROPHILS NFR BLD AUTO: 72.1 % (ref 50–75)
NRBC BLD AUTO-RTO: 0.1 % (ref 0–2)
PLATELET # BLD: 83 K/UL (ref 130–400)
PMV BLD AUTO: 10 FL (ref 7.2–11.7)
PROTHROMBIN TIME: 11.6 SECONDS (ref 9.7–12.2)
RBC # BLD AUTO: 4.42 MIL/UL (ref 4.4–5.9)
WBC # BLD AUTO: 5.9 K/UL (ref 4.8–10.8)

## 2018-03-19 RX ADMIN — POTASSIUM CHLORIDE SCH MEQ: 20 TABLET, EXTENDED RELEASE ORAL at 09:15

## 2018-03-19 RX ADMIN — PANTOPRAZOLE SODIUM SCH MG: 40 TABLET, DELAYED RELEASE ORAL at 09:16

## 2018-03-19 NOTE — CON
DATE:  03/19/2018



TIME OF CONSULTATION:  10:30 a.m.



BRIEF HISTORY:  The patient is a 32-year-old sexually active  male,

who presents to [facility] Emergency Room with alcohol intoxication

and was found to have microscopic hematuria on routine urinalysis.  The

patient had an abdominal, renal, and bladder ultrasound, which were all

relatively normal.  The patient had a pre-void volume of about 148 mL and a

post void residual of 0.  There were no renal masses.  No hydronephrosis

and no renal or ureteral calculi.  The patient denies any history of any

abdominal pain or renal colic.  No dysuria or gross hematuria.  Denies any

prior medical history.  No history of any kidney disease or kidney stones. 

No STDs or treatment for STDs.  No family history or history of cancer.



PHYSICAL EXAMINATION

SKIN:  He has multiple ecchymoses on his upper and lower extremities, which

he does not remember how he obtained during his intoxication episode.

GENERAL:  He is a well-developed, well-nourished  male.  He is

alert.  He is oriented.

HEENT:  Grossly within normal limits.

NECK:  Supple.  Thyroid not palpable.

ABDOMEN:  Soft, nondistended, nontender.  No CVA tenderness.  No suprapubic

tenderness..

GENITALIA:  The patient is non-circumcised with a normal glandular meatus

without any rashes or lesions visualized.  Testes are down bilaterally,

nontender without any masses.

RECTAL:  Normal rectal tone without fluctuance or masses.  Prostate is

average size, smooth, symmetrical, nontender without nodules or indurations

with a palpable median sulcus.  The patient's prostate volume was 20 mL on

bladder ultrasound.

EXTREMITIES:  The patient has full range of motion of both upper and lower

extremities.  No leg edema or calf tenderness present.



SOCIAL HISTORY:  The patient also denies any history of any tobacco use.



ALLERGIES:  THE PATIENT DENIES ANY HISTORY OF ANY ALLERGIES TO MEDICATIONS.



LABORATORY EVALUATION:  On 03/18/2018 shows a CBC with WBC count of 7.5,

hemoglobin of 13.8, hematocrit 39.8, and platelet count was 107,000, which

was slightly low.  Sodium was 134, potassium 3.0, chloride 93, CO2 24, BUN

and creatinine 7 and 0.5 respectively, with GFR greater than 60.  Random

glucose was 90, calcium was 8.6, magnesium was 2.2.  Total bilirubin was

2.1.  AST was 297, ALT was 215, both markedly elevated.  Alkaline

phosphatase is 117.  Creatine kinase was 4029.  TSH was 0.47.



Urinalysis:  Color was red, initially in the ER this color was yellow.  It

was hazy, pH 6.0, specific gravity 1.011, protein 2+, glucose is 1+,

ketones was 2+, blood was 3+, and nitrites was negative, leukocyte esterase

was negative.  There were 7 WBCs and 3025 RBCs per high-powered field.  On

admission, there were 27 RBCs per high-powered field.  Alcohol quantitative

was 383.  Hepatitis profile was negative.



DIAGNOSTIC IMPRESSION:

1.  Gross microscopic hematuria.

2.  Thrombocytopenia.

3.  Elevated liver enzymes.



Plan for this patient is to maintain the patient on fluid hydration, send

the urine for culture and sensitivity, correct the patient's liver enzymes

and platelets, and the patient can be seen in office followup in two weeks.

Treat the patient for any urinary tract infection if positive.





__________________________________________

Jaiden Schulte MD





DD:  03/19/2018 10:39:18

DT:  03/19/2018 12:13:11

Job # [id number]





MTDMARIA ESTHER

## 2018-03-19 NOTE — US
HISTORY:

Elevated LFT and hematruria



COMPARISON:

None.



TECHNIQUE:

Sonographic evaluation of the abdomen.



FINDINGS:



LIVER:

Measures 22.2 cm.  Diffusely increased echogenicity of the liver 

parenchyma. Consistent with fatty infiltration.  Smooth contour. No 

mass. No biliary ductal dilatation.



GALLBLADDER:

Unremarkable. No gallstones.



COMMON BILE DUCT:

Measures 4 mm. No stones. No dilatation.



PANCREAS:

Unremarkable as visualized. No mass. No ductal dilatation.



RIGHT KIDNEY:

Measures 12.6cm. Normal echogenicity. No calculus, mass, or 

hydronephrosis.



LEFT KIDNEY:

Measures 13.0cm. Normal echogenicity. No calculus, mass, or 

hydronephrosis.



SPLEEN:

Normal in size and contour. No mass.



AORTA:

No aneurysmal dilatation. 



IVC:

Unremarkable. 



OTHER FINDINGS:

None. 



IMPRESSION:

Hepatomegaly. Fatty infiltration of the liver.  Otherwise 

unremarkable examination.

## 2018-03-19 NOTE — CP.PCM.PN
<MarckLinden S - Last Filed: 03/19/18 12:37>





Subjective





- Date & Time of Evaluation


Date of Evaluation: 03/19/18


Time of Evaluation: 07:40





- Subjective


Subjective: 





Medicine progress note for Dr. Melton





Patient seen and examined. Patient states that he is feeling much better and 

has no acute complaints. Patient states that he is wondering when he will be 

discharged, but he was advised that he needs more time to assess his status 

after continuing the tapering. Patient states that he thinks his ecchymoses are 

from allergy to alcohol.





Objective





- Vital Signs/Intake and Output


Vital Signs (last 24 hours): 


 











Temp Pulse Resp BP Pulse Ox


 


 98.5 F   110 H  18   125/88   97 


 


 03/19/18 07:35  03/19/18 07:35  03/19/18 07:35  03/19/18 07:35  03/19/18 07:35








Intake and Output: 


 











 03/19/18 03/19/18





 06:59 18:59


 


Intake Total 1840 


 


Output Total 2200 


 


Balance -360 














- Medications


Medications: 


 Current Medications





Folic Acid (Folic Acid)  1 mg PO DAILY [facility]


Gabapentin (Neurontin)  400 mg PO TID [facility]


   Last Admin: 03/18/18 17:14 Dose:  400 mg


Haloperidol (Haldol)  5 mg PO Q4H PRN


   PRN Reason: Agitation


Haloperidol Lactate (Haldol)  5 mg IVP Q6H PRN


   PRN Reason: severe agitation


Sodium Chloride (Sodium Chloride 0.9%)  1,000 mls @ 100 mls/hr IV .Q10H [facility]


   Last Admin: 03/18/18 21:08 Dose:  100 mls/hr


Ibuprofen (Motrin Tab)  400 mg PO Q6 PRN


   PRN Reason: Fever >100.4 F


Lorazepam (Ativan)  2 mg IVP Q8H [facility]


   PRN Reason: Taper


   Stop: 03/21/18 12:29


   Last Admin: 03/19/18 04:20 Dose:  2 mg


Lorazepam (Ativan)  1 mg IVP Q3H [facility]


   PRN Reason: Breakthru alcohol withdrawal


   Last Admin: 03/17/18 13:56 Dose:  1 mg


Lorazepam (Ativan)  2 mg IVP Q4H PRN


   PRN Reason: Seizure activity


Mirtazapine (Remeron)  15 mg PO HS [facility]


   Last Admin: 03/18/18 21:08 Dose:  15 mg


Ondansetron HCl (Zofran Inj)  4 mg IVP Q6 PRN


   PRN Reason: Nausea/Vomiting


Pantoprazole Sodium (Protonix Ec Tab)  40 mg PO DAILY [facility]


   Last Admin: 03/18/18 09:14 Dose:  40 mg


Pneumococcal Polyvalent Vaccine (Pneumovax 23 Vaccine)  0.5 ml IM .ONCE ONE


   Stop: 03/19/18 10:01


Potassium Chloride (K-Dur 20 Meq Er Tab)  40 meq PO DAILY [facility]


   Last Admin: 03/18/18 15:02 Dose:  40 meq


Thiamine HCl (Vitamin B1 Tab)  100 mg PO DAILY [facility]











- Labs


Labs: 


 





 03/19/18 06:51 





 03/19/18 06:51 





 











PT  11.6 SECONDS (9.7-12.2)   03/19/18  06:51    


 


INR  1.0   03/19/18  06:51    


 


APTT  30 SECONDS (21-34)   03/18/18  16:00    














- Additional Findings


Additional findings: 





- Constitutional


Appears: No Acute Distress, Confused





- Head Exam


Head Exam: ATRAUMATIC, NORMOCEPHALIC





- Eye Exam


Eye Exam: Normal appearance





- ENT Exam


ENT Exam: Mucous Membranes Moist





- Neck Exam


Neck Exam: Normal Inspection





- Respiratory Exam


Respiratory Exam: Clear to Auscultation Bilateral, NORMAL BREATHING PATTERN.  

absent: Respiratory Distress





- Cardiovascular Exam


Cardiovascular Exam: Tachycardia, +S1, +S2





- GI/Abdominal Exam


GI & Abdominal Exam: Soft, Normal Bowel Sounds





- Neurological Exam


Neurological Exam: Awake. Alert. Oriented x3.





- Psychiatric Exam


Psychiatric exam: Flat Affect





- Skin


Additional comments: 





ecchymosis located at medial left arm, bilateral forearm, and left flank





Assessment and Plan





- Assessment and Plan (Free Text)


Plan: 





Alcohol intoxication


-NS @100ml/hr


-PO folic acid and thiamine


-Ativan IV 1mg Q4hr prn


-CIWA


-Head CT shows no evidence of intracranial mass or hemorrhage


-Psychiatry consult, Dr. Clements help appreciated


* Psych meds per Dr. Clements including Ativan taper and Ativan prn, Haldol prn 

for agitation, Gabapentin for augmentation


-Patient counseled on alcohol cessation





Transaminitis


-Abdominal US shows hepatamegaly and fatty liver


-Hepatitis panel negative


-Downtrending LFTs





Rhabdomyolysis


-NS @100ml/hr


-Downtrending CPK





Hematuria


-Light red color urine this afternoon


-Repeat UA showed RBC 3025


-Urology consult, Dr. Schulte help appreciated





Hyperlipidemia


-, Chol 349, , HDL 84


-Will start med once  LFT and rhabdomyolysis improved





Prophylactic measures


-Protonix


-SCD


-Zofran


-Motrin





Disposition: Chronic alcoholic on Ativan taper scheduled to end after tomorrow'

s dosing. Once the taper finishes, we will need to monitor him off of scheduled 

benzodiazepines before discharge.





Discussed with Dr. Linh Acharya PGY-1





<Abraham Melton H - Last Filed: 03/19/18 13:39>





Objective





- Vital Signs/Intake and Output


Vital Signs (last 24 hours): 


 











Temp Pulse Resp BP Pulse Ox


 


 98.5 F   110 H  18   125/88   97 


 


 03/19/18 07:35  03/19/18 07:35  03/19/18 07:35  03/19/18 07:35  03/19/18 07:35








Intake and Output: 


 











 03/19/18 03/19/18





 06:59 18:59


 


Intake Total 1840 


 


Output Total 2200 


 


Balance -360 














- Medications


Medications: 


 Current Medications





Folic Acid (Folic Acid)  1 mg PO DAILY [facility]


   Last Admin: 03/19/18 09:15 Dose:  1 mg


Gabapentin (Neurontin)  400 mg PO TID [facility]


   Last Admin: 03/19/18 09:16 Dose:  400 mg


Haloperidol (Haldol)  5 mg PO Q4H PRN


   PRN Reason: Agitation


Haloperidol Lactate (Haldol)  5 mg IVP Q6H PRN


   PRN Reason: severe agitation


Sodium Chloride (Sodium Chloride 0.9%)  1,000 mls @ 100 mls/hr IV .Q10H [facility]


   Last Admin: 03/19/18 08:00 Dose:  100 mls/hr


Ibuprofen (Motrin Tab)  400 mg PO Q6 PRN


   PRN Reason: Fever >100.4 F


Lorazepam (Ativan)  2 mg IVP Q12H [facility]


   PRN Reason: Taper


   Stop: 03/21/18 12:29


   Last Admin: 03/19/18 12:43 Dose:  2 mg


Lorazepam (Ativan)  1 mg IVP Q3H [facility]


   PRN Reason: Breakthru alcohol withdrawal


   Last Admin: 03/17/18 13:56 Dose:  1 mg


Lorazepam (Ativan)  2 mg IVP Q4H [facility]


   PRN Reason: Seizure activity


Mirtazapine (Remeron)  15 mg PO HS [facility]


   Last Admin: 03/18/18 21:08 Dose:  15 mg


Ondansetron HCl (Zofran Inj)  4 mg IVP Q6 PRN


   PRN Reason: Nausea/Vomiting


Pantoprazole Sodium (Protonix Ec Tab)  40 mg PO DAILY [facility]


   Last Admin: 03/19/18 09:16 Dose:  40 mg


Potassium Chloride (K-Dur 20 Meq Er Tab)  40 meq PO DAILY [facility]


   Last Admin: 03/19/18 09:15 Dose:  40 meq


Thiamine HCl (Vitamin B1 Tab)  100 mg PO DAILY [facility]


   Last Admin: 03/19/18 09:16 Dose:  100 mg











- Labs


Labs: 


 





 03/19/18 06:51 





 03/19/18 06:51 





 











PT  11.6 SECONDS (9.7-12.2)   03/19/18  06:51    


 


INR  1.0   03/19/18  06:51    


 


APTT  30 SECONDS (21-34)   03/18/18  16:00    














Attending/Attestation





- Attestation


I have personally seen and examined this patient.: Yes


I have fully participated in the care of the patient.: Yes


I have reviewed all pertinent clinical information, including history, physical 

exam and plan: Yes


Notes (Text): 








Medical attending: Patient was seen and examined by me. Agree with the above 

note by the resident





The patient was still on the tapering protocol when we saw him. He was not in 

any acute distress or signs of withdrawl - however while he did ask to be 

discharged we will wait until the taper is finnished before doing this. His 

affect is very slow, he does not answer questions immediately. He does respond 

to questions appropriately .





thank you


Abraham Melton

## 2018-03-19 NOTE — US
PROCEDURE:  Ultrasound urinary bladder



HISTORY:

HEMATURIA



COMPARISON:

Not available



TECHNIQUE:

Transabdominal



FINDINGS:

The distended urinary bladder measures 148.3 cc. This is suboptimally 

distended. The wall is grossly normal in thickness though evaluation 

is limited.  It is smooth.  There is no intraluminal mass.  Bilateral 

ureteral jets are demonstrated.



The postvoid residual in the urinary bladder is 0 cc.



The prostate measures 22.0 cc.



IMPRESSION:

No postvoid residual in the urinary bladder.  Limited evaluation of 

bladder as described.

## 2018-03-20 VITALS
HEART RATE: 117 BPM | TEMPERATURE: 97.6 F | SYSTOLIC BLOOD PRESSURE: 132 MMHG | DIASTOLIC BLOOD PRESSURE: 84 MMHG | OXYGEN SATURATION: 98 %

## 2018-03-20 LAB
ALBUMIN SERPL-MCNC: 3.6 G/DL (ref 3.5–5)
ALBUMIN/GLOB SERPL: 1.1 {RATIO} (ref 1–2.1)
ALT SERPL-CCNC: 189 U/L (ref 21–72)
AST SERPL-CCNC: 140 U/L (ref 17–59)
BASOPHILS # BLD AUTO: 0 K/UL (ref 0–0.2)
BASOPHILS NFR BLD: 0.3 % (ref 0–2)
BUN SERPL-MCNC: 5 MG/DL (ref 9–20)
CALCIUM SERPL-MCNC: 8.4 MG/DL (ref 8.6–10.4)
CK MB SERPL-MCNC: 1.06 NG/ML (ref 0–3.38)
EOSINOPHIL # BLD AUTO: 0.2 K/UL (ref 0–0.7)
EOSINOPHIL NFR BLD: 4.2 % (ref 0–4)
ERYTHROCYTE [DISTWIDTH] IN BLOOD BY AUTOMATED COUNT: 14 % (ref 11.5–14.5)
GFR NON-AFRICAN AMERICAN: > 60
HGB BLD-MCNC: 13 G/DL (ref 12–18)
LYMPHOCYTES # BLD AUTO: 1.2 K/UL (ref 1–4.3)
LYMPHOCYTES NFR BLD AUTO: 23 % (ref 20–40)
MCH RBC QN AUTO: 30.7 PG (ref 27–31)
MCHC RBC AUTO-ENTMCNC: 33.8 G/DL (ref 33–37)
MCV RBC AUTO: 90.7 FL (ref 80–94)
MONOCYTES # BLD: 0.4 K/UL (ref 0–0.8)
MONOCYTES NFR BLD: 7.9 % (ref 0–10)
NEUTROPHILS # BLD: 3.4 K/UL (ref 1.8–7)
NEUTROPHILS NFR BLD AUTO: 64.6 % (ref 50–75)
NRBC BLD AUTO-RTO: 0.1 % (ref 0–2)
PLATELET # BLD: 101 K/UL (ref 130–400)
PMV BLD AUTO: 9.9 FL (ref 7.2–11.7)
RBC # BLD AUTO: 4.25 MIL/UL (ref 4.4–5.9)
WBC # BLD AUTO: 5.2 K/UL (ref 4.8–10.8)

## 2018-03-20 RX ADMIN — PANTOPRAZOLE SODIUM SCH MG: 40 TABLET, DELAYED RELEASE ORAL at 09:28

## 2018-03-20 RX ADMIN — POTASSIUM CHLORIDE SCH MEQ: 20 TABLET, EXTENDED RELEASE ORAL at 09:27

## 2018-03-20 NOTE — CP.PCM.PN
Subjective





- Date & Time of Evaluation


Date of Evaluation: 03/20/18


Time of Evaluation: 07:10





- Subjective


Subjective: 





Medicine progress note for Dr. Melton





Patient seen and examined.





Objective





- Vital Signs/Intake and Output


Vital Signs (last 24 hours): 


 











Temp Pulse Resp BP Pulse Ox


 


 98.8 F   109 H  20   103/68   96 


 


 03/19/18 23:00  03/20/18 00:35  03/19/18 23:00  03/19/18 23:00  03/19/18 23:00








Intake and Output: 


 











 03/20/18 03/20/18





 06:59 18:59


 


Intake Total 2190 


 


Output Total 1950 


 


Balance 240 














- Medications


Medications: 


 Current Medications





Folic Acid (Folic Acid)  1 mg PO DAILY [facility]


   Last Admin: 03/19/18 09:15 Dose:  1 mg


Gabapentin (Neurontin)  400 mg PO TID [facility]


   Last Admin: 03/19/18 18:07 Dose:  400 mg


Haloperidol (Haldol)  5 mg PO Q4H PRN


   PRN Reason: Agitation


Haloperidol Lactate (Haldol)  5 mg IVP Q6H PRN


   PRN Reason: severe agitation


Sodium Chloride (Sodium Chloride 0.9%)  1,000 mls @ 100 mls/hr IV .Q10H [facility]


   Last Admin: 03/20/18 02:30 Dose:  Not Given


Ibuprofen (Motrin Tab)  400 mg PO Q6 PRN


   PRN Reason: Fever >100.4 F


Lorazepam (Ativan)  2 mg IVP Q12H [facility]


   PRN Reason: Taper


   Stop: 03/21/18 12:29


   Last Admin: 03/20/18 00:07 Dose:  2 mg


Lorazepam (Ativan)  1 mg IVP Q3H [facility]


   PRN Reason: Breakthru alcohol withdrawal


   Last Admin: 03/17/18 13:56 Dose:  1 mg


Lorazepam (Ativan)  2 mg IVP Q4H PRN


   PRN Reason: Seizure activity


Mirtazapine (Remeron)  15 mg PO HS [facility]


   Last Admin: 03/19/18 21:43 Dose:  15 mg


Ondansetron HCl (Zofran Inj)  4 mg IVP Q6 PRN


   PRN Reason: Nausea/Vomiting


Pantoprazole Sodium (Protonix Ec Tab)  40 mg PO DAILY [facility]


   Last Admin: 03/19/18 09:16 Dose:  40 mg


Potassium Chloride (K-Dur 20 Meq Er Tab)  40 meq PO DAILY [facility]


   Last Admin: 03/19/18 09:15 Dose:  40 meq


Thiamine HCl (Vitamin B1 Tab)  100 mg PO DAILY [facility]


   Last Admin: 03/19/18 09:16 Dose:  100 mg











- Labs


Labs: 


 





 03/20/18 06:19 





 03/20/18 06:19 





 











PT  11.6 SECONDS (9.7-12.2)   03/19/18  06:51    


 


INR  1.0   03/19/18  06:51    


 


APTT  30 SECONDS (21-34)   03/18/18  16:00    














- Additional Findings


Additional findings: 





- Constitutional


Appears: No Acute Distress, Confused





- Head Exam


Head Exam: ATRAUMATIC, NORMOCEPHALIC





- Eye Exam


Eye Exam: Normal appearance





- ENT Exam


ENT Exam: Mucous Membranes Moist





- Neck Exam


Neck Exam: Normal Inspection





- Respiratory Exam


Respiratory Exam: Clear to Auscultation Bilateral, NORMAL BREATHING PATTERN.  

absent: Respiratory Distress





- Cardiovascular Exam


Cardiovascular Exam: Tachycardia, +S1, +S2





- GI/Abdominal Exam


GI & Abdominal Exam: Soft, Normal Bowel Sounds





- Neurological Exam


Neurological Exam: Awake. Alert. Oriented x3.





- Psychiatric Exam


Psychiatric exam: Flat Affect





- Skin


Additional comments: 





ecchymosis located at medial left arm, bilateral forearm, and left flank





Assessment and Plan





- Assessment and Plan (Free Text)


Plan: 





Alcohol intoxication


-NS @100ml/hr


-PO folic acid and thiamine


-Ativan IV 1mg Q4hr prn


-CIWA


-Head CT shows no evidence of intracranial mass or hemorrhage


-Psychiatry consult, Dr. Clements help appreciated


* Psych meds per Dr. Clements including Ativan taper and Ativan prn, Haldol prn 

for agitation, Gabapentin for augmentation


-Patient counseled on alcohol cessation





Transaminitis


-Abdominal US shows hepatamegaly and fatty liver


-Hepatitis panel negative


-Downtrending LFTs





Rhabdomyolysis


-NS @100ml/hr


-Downtrending CPK





Hematuria


-Light red color urine this afternoon


-Repeat UA showed RBC 3025


-Urology consult, Dr. Schulte help appreciated





Hyperlipidemia


-, Chol 349, , HDL 84


-Will start med once  LFT and rhabdomyolysis improved





Prophylactic measures


-Protonix


-SCD


-Zofran


-Motrin





Disposition: Chronic alcoholic on Ativan taper. Once the taper finishes, we 

will need to monitor him off of scheduled benzodiazepines before discharge.

## 2018-03-20 NOTE — CP.PCM.DIS
<Linden Acharya S - Last Filed: 03/20/18 11:27>





Provider





- Provider


Date of Admission: 


03/17/18 06:26





Attending physician: 


Abraham Melton DO





Consults: 





Psychiatry: Dr. Clements


Urology: Dr. Schulte


Time Spent in preparation of Discharge (in minutes): 40





Diagnosis





- Discharge Diagnosis


(1) Alcohol intoxication


Status: Acute   Priority: High   





(2) Transaminitis


Status: Acute   Priority: High   





(3) Rhabdomyolysis


Status: Acute   Priority: High   





(4) Hyperlipidemia


Status: Acute   Priority: Medium   





(5) Impaired glucose tolerance


Status: Acute   Priority: Medium   





(6) Microscopic hematuria


Status: Acute   Priority: Low   





Hospital Course





- Lab Results


Lab Results: 


 Most Recent Lab Values











WBC  5.2 K/uL (4.8-10.8)   03/20/18  06:19    


 


RBC  4.25 Mil/uL (4.40-5.90)  L  03/20/18  06:19    


 


Hgb  13.0 g/dL (12.0-18.0)   03/20/18  06:19    


 


Hct  38.5 % (35.0-51.0)   03/20/18  06:19    


 


MCV  90.7 fL (80.0-94.0)   03/20/18  06:19    


 


MCH  30.7 pg (27.0-31.0)   03/20/18  06:19    


 


MCHC  33.8 g/dL (33.0-37.0)   03/20/18  06:19    


 


RDW  14.0 % (11.5-14.5)   03/20/18  06:19    


 


Plt Count  101 K/uL (130-400)  L  03/20/18  06:19    


 


MPV  9.9 fL (7.2-11.7)   03/20/18  06:19    


 


Neut % (Auto)  64.6 % (50.0-75.0)   03/20/18  06:19    


 


Lymph % (Auto)  23.0 % (20.0-40.0)   03/20/18  06:19    


 


Mono % (Auto)  7.9 % (0.0-10.0)   03/20/18  06:19    


 


Eos % (Auto)  4.2 % (0.0-4.0)  H  03/20/18  06:19    


 


Baso % (Auto)  0.3 % (0.0-2.0)   03/20/18  06:19    


 


Neut # (Auto)  3.4 K/uL (1.8-7.0)   03/20/18  06:19    


 


Lymph # (Auto)  1.2 K/uL (1.0-4.3)   03/20/18  06:19    


 


Mono # (Auto)  0.4 K/uL (0.0-0.8)   03/20/18  06:19    


 


Eos # (Auto)  0.2 K/uL (0.0-0.7)   03/20/18  06:19    


 


Baso # (Auto)  0.0 K/uL (0.0-0.2)   03/20/18  06:19    


 


Differential Comment     03/18/18  08:26    


 


PT  11.6 SECONDS (9.7-12.2)   03/19/18  06:51    


 


INR  1.0   03/19/18  06:51    


 


APTT  30 SECONDS (21-34)   03/18/18  16:00    


 


Sodium  137 mmol/L (132-148)   03/20/18  06:19    


 


Potassium  3.1 mmol/L (3.6-5.2)  L  03/20/18  06:19    


 


Chloride  102 mmol/L ()   03/20/18  06:19    


 


Carbon Dioxide  27 mmol/L (22-30)   03/20/18  06:19    


 


Anion Gap  11  (10-20)   03/20/18  06:19    


 


BUN  5 mg/dL (9-20)  L  03/20/18  06:19    


 


Creatinine  0.5 mg/dL (0.8-1.5)  L  03/20/18  06:19    


 


Est GFR ( Amer)  > 60   03/20/18  06:19    


 


Est GFR (Non-Af Amer)  > 60   03/20/18  06:19    


 


POC Glucose (mg/dL)  109 mg/dL ()   03/17/18  00:37    


 


Random Glucose  119 mg/dL ()  H  03/20/18  06:19    


 


Hemoglobin A1c  6.4 % (4.2-6.5)   03/17/18  09:58    


 


Calcium  8.4 mg/dl (8.6-10.4)  L  03/20/18  06:19    


 


Magnesium  2.1 mg/dL (1.6-2.3)   03/20/18  06:19    


 


Total Bilirubin  0.9 mg/dL (0.2-1.3)   03/20/18  06:19    


 


AST  140 U/L (17-59)  H D 03/20/18  06:19    


 


ALT  189 U/L (21-72)  H  03/20/18  06:19    


 


Alkaline Phosphatase  100 U/L ()   03/20/18  06:19    


 


Ammonia  33 umol/L (9-33)   03/17/18  00:36    


 


Total Creatine Kinase  1132 U/L ()  H  03/19/18  06:51    


 


Total Protein  6.8 g/dL (6.3-8.3)   03/20/18  06:19    


 


Albumin  3.6 g/dL (3.5-5.0)   03/20/18  06:19    


 


Globulin  3.2 gm/dL (2.2-3.9)   03/20/18  06:19    


 


Albumin/Globulin Ratio  1.1  (1.0-2.1)   03/20/18  06:19    


 


Triglycerides  354 mg/dL (0-149)  H  03/17/18  00:36    


 


Cholesterol  349 mg/dL (0-199)  H  03/17/18  00:36    


 


LDL Cholesterol Direct  222 mg/dL (0-129)  H  03/17/18  00:36    


 


HDL Cholesterol  84 mg/dL (30-70)  H  03/17/18  00:36    


 


TSH 3rd Generation  0.47 mIU/L (0.46-4.68)   03/17/18  00:36    


 


Urine Color  Red  (YELLOW)   03/18/18  13:39    


 


Urine Clarity  Hazy  (Clear)   03/18/18  13:39    


 


Urine pH  6.0  (5.0-8.0)   03/18/18  13:39    


 


Ur Specific Gravity  1.011  (1.003-1.030)   03/18/18  13:39    


 


Urine Protein  2+ mg/dL (NEGATIVE)  H  03/18/18  13:39    


 


Urine Glucose (UA)  1+ mg/dL (Normal)  H  03/18/18  13:39    


 


Urine Ketones  2+ mg/dL (NEGATIVE)  H  03/18/18  13:39    


 


Urine Blood  3+  (NEGATIVE)  H  03/18/18  13:39    


 


Urine Nitrate  Negative  (NEGATIVE)   03/18/18  13:39    


 


Urine Bilirubin  Negative  (NEGATIVE)   03/18/18  13:39    


 


Urine Urobilinogen  Normal mg/dL (0.2-1.0)   03/18/18  13:39    


 


Ur Leukocyte Esterase  Neg Tara/uL (Negative)   03/18/18  13:39    


 


Urine WBC (Auto)  7 /hpf (0-5)  H  03/18/18  13:39    


 


Urine RBC (Auto)  3025 /hpf (0-3)  H  03/18/18  13:39    


 


Salicylates  < 1.0 mg/dL 1  03/17/18  00:45    


 


Urine Opiates Screen  Negative  (NEGATIVE)   03/17/18  03:02    


 


Urine Methadone Screen  Negative  (NEGATIVE)   03/17/18  03:02    


 


Acetaminophen  < 10.0 ug/mL (10.0-30.0)  L  03/17/18  00:45    


 


Ur Barbiturates Screen  Negative  (NEGATIVE)   03/17/18  03:02    


 


Ur Phencyclidine Scrn  Negative  (NEGATIVE)   03/17/18  03:02    


 


Ur Amphetamines Screen  Negative  (NEGATIVE)   03/17/18  03:02    


 


U Benzodiazepines Scrn  Negative  (NEGATIVE)   03/17/18  03:02    


 


U Oth Cocaine Metabols  Negative  (NEGATIVE)   03/17/18  03:02    


 


U Cannabinoids Screen  Negative  (NEGATIVE)   03/17/18  03:02    


 


Alcohol, Quantitative  383 mg/dl (0-10)  H  03/17/18  00:36    


 


Hepatitis A IgM Ab  Negative  (NEGATIVE)   03/18/18  08:26    


 


Hep Bs Antigen  Negative  (NEGATIVE)   03/18/18  08:26    


 


Hep B Core IgM Ab  Negative  (NEGATIVE)   03/18/18  08:26    


 


Hepatitis C Antibody  Negative  (NEGATIVE)   03/18/18  08:26    














- Hospital Course


Hospital Course: 





Initial Note:


"32 year old  male with history of alcohol abuse was brought to the ED 

by his brother for alcohol intoxication. Patient is intoxicated and unable to 

provide meaning history. Per brother and sister in law over the phone, patient 

is a chronic alcohol abuser with no other medical history. Patient has been 

drinking for the past 4 days. Relatives unable to verified if patient injured 

himself during intoxication. Unable to obtain further ROS due to patient's 

condition."





Hospital Course:


Patient admitted for alcohol intoxication. Psychiatry was consulted for detox. 

Patient managed with an Ativan taper given his elevated LFTs. Patient also with 

rhabdomyolysis which has improved with IV hydration. Abdominal ultrasound 

showed fatty liver and hepatomegaly. Negative hepatitis panel. Patient showed 

clinical improvement in mentation since admission and was walked around on 

rounds with no shortness of breath. Patient also found with microscopic 

hematuria. Outpatient follow up per urology was recommended. Patient found with 

hyperlipidemia as well as impaired glucose tolerance. Due to elevated LFTs, 

patient was not started on a statin in the hospital. Instructions for dietary 

modification were provided for the impaired glucose tolerance. Alcohol likely 

contributing, and patient has been counseled and stated that he will stop 

drinking alcohol. Patient stable for discharge home per primary team.





This is a summary of the hospital course. For more information, refer to the 

medical records.





Discharge Exam





- Additional Findings


Additional findings: 





- Constitutional


Appears: No Acute Distress, Confused





- Head Exam


Head Exam: ATRAUMATIC, NORMOCEPHALIC





- Eye Exam


Eye Exam: Normal appearance





- ENT Exam


ENT Exam: Mucous Membranes Moist





- Neck Exam


Neck Exam: Normal Inspection





- Respiratory Exam


Respiratory Exam: Clear to Auscultation Bilateral, NORMAL BREATHING PATTERN.  

absent: Respiratory Distress





- Cardiovascular Exam


Cardiovascular Exam: Tachycardia, +S1, +S2





- GI/Abdominal Exam


GI & Abdominal Exam: Soft, Normal Bowel Sounds





- Neurological Exam


Neurological Exam: Awake. Alert. Oriented x3.





- Psychiatric Exam


Psychiatric exam: Flat Affect





- Skin


Additional comments: 





ecchymosis located at medial left arm, bilateral forearm, and left flank





Discharge Plan





- Discharge Medications


Prescriptions: 


Folic Acid 1 mg PO DAILY #30 tab


Naproxen 500 mg PO Q12H #14 tablet


Thiamine [Vitamin B1 Tab] 100 mg PO DAILY #30 tab





- Follow Up Plan


Condition: STABLE


Disposition: HOME/ ROUTINE


Instructions:  Low Carbohydrate Diet, Alcohol Withdrawal (DC), Alcohol Abuse 

and Alcoholism (DC), Folic Acid, Naproxen, Thiamine


Additional Instructions: 


Please take the vitamins folic acid and thiamine once a day.


Please take the Naproxen every 12 hours ONLY if needed for pain. If you take it

, take it with food.


Please stop drinking alcohol as this can be disastrous to your health.


Please follow up in the [facility] in the [facility]] in one week.


You will also need to be seen by a urologist to evaluate for the blood in the 

urine. The clinic can facilitate this.


Please be careful with your diet and avoid excess sugar, bread, and pasta. You 

will need to follow a low carbohydrate diet.


You will need to also eat a low fat, low cholesterol diet.


If there are any new or worsening symptoms, please go to the nearest emergency 

room.








Por favor tome las vitaminas cido flico y tiamina jenni vez al da.


Por favor, tome Naproxen cada 12 horas SOLAMENTE si es necesario para el dolor. 

Si lo selene, tmalo con comida.


Por favor fe de beber alcohol ya que esto puede ser desastroso para tu leighton.


Por favor elvin un seguimiento en el [facility] en el [facility] en jenni semana.


Tambin deber ser visto por un urlogo para evaluar la presencia de kevin en 

la orina. La clnica puede facilitar esto.


Tenga cuidado con maria dieta y evite el exceso de azcar, pan y pasta. Tendr que 

seguir jenni dieta baja en carbohidratos.


Tambin necesitar comer jenni dieta baja en grasas y baja en colesterol.


Si hay sntomas nuevos o que empeoran, vaya a la wiliam de emergencias ms 

prachi.


Referrals: 


[facility] [Outside]





<Abraham Melton - Last Filed: 03/20/18 13:35>





Provider





- Provider


Date of Admission: 


03/17/18 06:26





Attending physician: 


Abraham Melton, 








Hospital Course





- Lab Results


Lab Results: 


 Most Recent Lab Values











WBC  5.2 K/uL (4.8-10.8)   03/20/18  06:19    


 


RBC  4.25 Mil/uL (4.40-5.90)  L  03/20/18  06:19    


 


Hgb  13.0 g/dL (12.0-18.0)   03/20/18  06:19    


 


Hct  38.5 % (35.0-51.0)   03/20/18  06:19    


 


MCV  90.7 fL (80.0-94.0)   03/20/18  06:19    


 


MCH  30.7 pg (27.0-31.0)   03/20/18  06:19    


 


MCHC  33.8 g/dL (33.0-37.0)   03/20/18  06:19    


 


RDW  14.0 % (11.5-14.5)   03/20/18  06:19    


 


Plt Count  101 K/uL (130-400)  L  03/20/18  06:19    


 


MPV  9.9 fL (7.2-11.7)   03/20/18  06:19    


 


Neut % (Auto)  64.6 % (50.0-75.0)   03/20/18  06:19    


 


Lymph % (Auto)  23.0 % (20.0-40.0)   03/20/18  06:19    


 


Mono % (Auto)  7.9 % (0.0-10.0)   03/20/18  06:19    


 


Eos % (Auto)  4.2 % (0.0-4.0)  H  03/20/18  06:19    


 


Baso % (Auto)  0.3 % (0.0-2.0)   03/20/18  06:19    


 


Neut # (Auto)  3.4 K/uL (1.8-7.0)   03/20/18  06:19    


 


Lymph # (Auto)  1.2 K/uL (1.0-4.3)   03/20/18  06:19    


 


Mono # (Auto)  0.4 K/uL (0.0-0.8)   03/20/18  06:19    


 


Eos # (Auto)  0.2 K/uL (0.0-0.7)   03/20/18  06:19    


 


Baso # (Auto)  0.0 K/uL (0.0-0.2)   03/20/18  06:19    


 


Differential Comment     03/18/18  08:26    


 


PT  11.6 SECONDS (9.7-12.2)   03/19/18  06:51    


 


INR  1.0   03/19/18  06:51    


 


APTT  30 SECONDS (21-34)   03/18/18  16:00    


 


Sodium  137 mmol/L (132-148)   03/20/18  06:19    


 


Potassium  3.1 mmol/L (3.6-5.2)  L  03/20/18  06:19    


 


Chloride  102 mmol/L ()   03/20/18  06:19    


 


Carbon Dioxide  27 mmol/L (22-30)   03/20/18  06:19    


 


Anion Gap  11  (10-20)   03/20/18  06:19    


 


BUN  5 mg/dL (9-20)  L  03/20/18  06:19    


 


Creatinine  0.5 mg/dL (0.8-1.5)  L  03/20/18  06:19    


 


Est GFR ( Amer)  > 60   03/20/18  06:19    


 


Est GFR (Non-Af Amer)  > 60   03/20/18  06:19    


 


POC Glucose (mg/dL)  109 mg/dL ()   03/17/18  00:37    


 


Random Glucose  119 mg/dL ()  H  03/20/18  06:19    


 


Hemoglobin A1c  6.4 % (4.2-6.5)   03/17/18  09:58    


 


Calcium  8.4 mg/dl (8.6-10.4)  L  03/20/18  06:19    


 


Magnesium  2.1 mg/dL (1.6-2.3)   03/20/18  06:19    


 


Total Bilirubin  0.9 mg/dL (0.2-1.3)   03/20/18  06:19    


 


AST  140 U/L (17-59)  H D 03/20/18  06:19    


 


ALT  189 U/L (21-72)  H  03/20/18  06:19    


 


Alkaline Phosphatase  100 U/L ()   03/20/18  06:19    


 


Ammonia  33 umol/L (9-33)   03/17/18  00:36    


 


Total Creatine Kinase  308 U/L ()  H  03/20/18  11:45    


 


CK-MB (Mass)  1.06 ng/mL (0.0-3.38)   03/20/18  11:45    


 


Troponin I  < 0.0120 ng/mL (0.00-0.120)   03/20/18  11:45    


 


Total Protein  6.8 g/dL (6.3-8.3)   03/20/18  06:19    


 


Albumin  3.6 g/dL (3.5-5.0)   03/20/18  06:19    


 


Globulin  3.2 gm/dL (2.2-3.9)   03/20/18  06:19    


 


Albumin/Globulin Ratio  1.1  (1.0-2.1)   03/20/18  06:19    


 


Triglycerides  354 mg/dL (0-149)  H  03/17/18  00:36    


 


Cholesterol  349 mg/dL (0-199)  H  03/17/18  00:36    


 


LDL Cholesterol Direct  222 mg/dL (0-129)  H  03/17/18  00:36    


 


HDL Cholesterol  84 mg/dL (30-70)  H  03/17/18  00:36    


 


TSH 3rd Generation  0.47 mIU/L (0.46-4.68)   03/17/18  00:36    


 


Urine Color  Red  (YELLOW)   03/18/18  13:39    


 


Urine Clarity  Hazy  (Clear)   03/18/18  13:39    


 


Urine pH  6.0  (5.0-8.0)   03/18/18  13:39    


 


Ur Specific Gravity  1.011  (1.003-1.030)   03/18/18  13:39    


 


Urine Protein  2+ mg/dL (NEGATIVE)  H  03/18/18  13:39    


 


Urine Glucose (UA)  1+ mg/dL (Normal)  H  03/18/18  13:39    


 


Urine Ketones  2+ mg/dL (NEGATIVE)  H  03/18/18  13:39    


 


Urine Blood  3+  (NEGATIVE)  H  03/18/18  13:39    


 


Urine Nitrate  Negative  (NEGATIVE)   03/18/18  13:39    


 


Urine Bilirubin  Negative  (NEGATIVE)   03/18/18  13:39    


 


Urine Urobilinogen  Normal mg/dL (0.2-1.0)   03/18/18  13:39    


 


Ur Leukocyte Esterase  Neg Tara/uL (Negative)   03/18/18  13:39    


 


Urine WBC (Auto)  7 /hpf (0-5)  H  03/18/18  13:39    


 


Urine RBC (Auto)  3025 /hpf (0-3)  H  03/18/18  13:39    


 


Salicylates  < 1.0 mg/dL 1  03/17/18  00:45    


 


Urine Opiates Screen  Negative  (NEGATIVE)   03/17/18  03:02    


 


Urine Methadone Screen  Negative  (NEGATIVE)   03/17/18  03:02    


 


Acetaminophen  < 10.0 ug/mL (10.0-30.0)  L  03/17/18  00:45    


 


Ur Barbiturates Screen  Negative  (NEGATIVE)   03/17/18  03:02    


 


Ur Phencyclidine Scrn  Negative  (NEGATIVE)   03/17/18  03:02    


 


Ur Amphetamines Screen  Negative  (NEGATIVE)   03/17/18  03:02    


 


U Benzodiazepines Scrn  Negative  (NEGATIVE)   03/17/18  03:02    


 


U Oth Cocaine Metabols  Negative  (NEGATIVE)   03/17/18  03:02    


 


U Cannabinoids Screen  Negative  (NEGATIVE)   03/17/18  03:02    


 


Alcohol, Quantitative  383 mg/dl (0-10)  H  03/17/18  00:36    


 


Hepatitis A IgM Ab  Negative  (NEGATIVE)   03/18/18  08:26    


 


Hep Bs Antigen  Negative  (NEGATIVE)   03/18/18  08:26    


 


Hep B Core IgM Ab  Negative  (NEGATIVE)   03/18/18  08:26    


 


Hepatitis C Antibody  Negative  (NEGATIVE)   03/18/18  08:26    














Attending/Attestation





- Attestation


I have personally seen and examined this patient.: Yes


I have fully participated in the care of the patient.: Yes


I have reviewed all pertinent clinical information, including history, physical 

exam and plan: Yes


Notes (Text): 





03/20/18 13:32


Medical attending: Patient was seen and examined by me. Agree with the above 

note by the resident. 





The patient was able to ambulate into the hallway and back to his room. He did 

not exhibit signs of tremors and when walking in the hallway he denied chest 

pain and denied shortness of breath. Tolerating diet ok as well. When he was 

admitted last week he had a very elevated CPK level from rhabdomylysis - this 

has since then improved substantially. He was prevoiusly on IVF and now is 

eating and drinking ok. He denied muscles aches/ pains





We explained to him that he has to try to avoid alcohol as he is still young 

and has a long life a head of him. 





thank you


Abraham Melton

## 2018-03-21 NOTE — CARD
--------------- APPROVED REPORT --------------





EKG Measurement

Heart [id number]

OH 126P61

[id number]

[id number]

[id number]



<Conclusion>

Sinus tachycardia

T wave abnormality, consider inferior ischemia

Abnormal ECG

## 2018-03-21 NOTE — CARD
--------------- APPROVED REPORT --------------





EKG Measurement

Heart [id number]

VT 132P54

[id number]

[id number]

[id number]



<Conclusion>

Sinus tachycardia

Otherwise normal ECG

## 2018-03-29 ENCOUNTER — HOSPITAL ENCOUNTER (EMERGENCY)
Dept: HOSPITAL 31 - C.ER | Age: 33
Discharge: HOME | End: 2018-03-29
Payer: COMMERCIAL

## 2018-03-29 VITALS — TEMPERATURE: 98.4 F | OXYGEN SATURATION: 98 %

## 2018-03-29 VITALS — RESPIRATION RATE: 18 BRPM | SYSTOLIC BLOOD PRESSURE: 100 MMHG | DIASTOLIC BLOOD PRESSURE: 60 MMHG | HEART RATE: 82 BPM

## 2018-03-29 DIAGNOSIS — M25.462: ICD-10-CM

## 2018-03-29 DIAGNOSIS — R07.9: Primary | ICD-10-CM

## 2018-03-29 LAB
ALBUMIN SERPL-MCNC: 4.3 G/DL (ref 3.5–5)
ALBUMIN/GLOB SERPL: 1.2 {RATIO} (ref 1–2.1)
ALT SERPL-CCNC: 186 U/L (ref 21–72)
APTT BLD: 31 SECONDS (ref 21–34)
AST SERPL-CCNC: 91 U/L (ref 17–59)
BASOPHILS # BLD AUTO: 0.1 K/UL (ref 0–0.2)
BASOPHILS NFR BLD: 0.7 % (ref 0–2)
BUN SERPL-MCNC: 20 MG/DL (ref 9–20)
CALCIUM SERPL-MCNC: 8.8 MG/DL (ref 8.6–10.4)
CK MB SERPL-MCNC: 1.9 NG/ML (ref 0–3.38)
D DIMER: 835 NG/MLDDU (ref 0–243)
EOSINOPHIL # BLD AUTO: 0.1 K/UL (ref 0–0.7)
EOSINOPHIL NFR BLD: 1.4 % (ref 0–4)
ERYTHROCYTE [DISTWIDTH] IN BLOOD BY AUTOMATED COUNT: 14.7 % (ref 11.5–14.5)
GFR NON-AFRICAN AMERICAN: > 60
HGB BLD-MCNC: 12.7 G/DL (ref 12–18)
INR PPP: 0.9
LIPASE: 104 U/L (ref 23–300)
LYMPHOCYTES # BLD AUTO: 2.3 K/UL (ref 1–4.3)
LYMPHOCYTES NFR BLD AUTO: 29 % (ref 20–40)
MCH RBC QN AUTO: 30.6 PG (ref 27–31)
MCHC RBC AUTO-ENTMCNC: 32.9 G/DL (ref 33–37)
MCV RBC AUTO: 93.1 FL (ref 80–94)
MONOCYTES # BLD: 0.7 K/UL (ref 0–0.8)
MONOCYTES NFR BLD: 8.5 % (ref 0–10)
NEUTROPHILS # BLD: 4.8 K/UL (ref 1.8–7)
NEUTROPHILS NFR BLD AUTO: 60.4 % (ref 50–75)
NRBC BLD AUTO-RTO: 0.1 % (ref 0–2)
PLATELET # BLD: 373 K/UL (ref 130–400)
PMV BLD AUTO: 8.8 FL (ref 7.2–11.7)
PROTHROMBIN TIME: 10.3 SECONDS (ref 9.7–12.2)
RBC # BLD AUTO: 4.16 MIL/UL (ref 4.4–5.9)
WBC # BLD AUTO: 7.9 K/UL (ref 4.8–10.8)

## 2018-03-29 PROCEDURE — 80053 COMPREHEN METABOLIC PANEL: CPT

## 2018-03-29 PROCEDURE — 85378 FIBRIN DEGRADE SEMIQUANT: CPT

## 2018-03-29 PROCEDURE — 85025 COMPLETE CBC W/AUTO DIFF WBC: CPT

## 2018-03-29 PROCEDURE — 84484 ASSAY OF TROPONIN QUANT: CPT

## 2018-03-29 PROCEDURE — 85730 THROMBOPLASTIN TIME PARTIAL: CPT

## 2018-03-29 PROCEDURE — 71101 X-RAY EXAM UNILAT RIBS/CHEST: CPT

## 2018-03-29 PROCEDURE — 71275 CT ANGIOGRAPHY CHEST: CPT

## 2018-03-29 PROCEDURE — 85610 PROTHROMBIN TIME: CPT

## 2018-03-29 PROCEDURE — 96374 THER/PROPH/DIAG INJ IV PUSH: CPT

## 2018-03-29 PROCEDURE — 93005 ELECTROCARDIOGRAM TRACING: CPT

## 2018-03-29 PROCEDURE — 83690 ASSAY OF LIPASE: CPT

## 2018-03-29 PROCEDURE — 99284 EMERGENCY DEPT VISIT MOD MDM: CPT

## 2018-03-29 PROCEDURE — 73562 X-RAY EXAM OF KNEE 3: CPT

## 2018-03-29 NOTE — CT
EXAM:

  CT Angiography Chest With Intravenous Contrast



CLINICAL HISTORY:

  33 years old, male; Pain; Chest pain; Type not specified; Additional info: 

Left chest pain. R/O pe



TECHNIQUE:

  Axial computed tomographic angiography images of the chest with intravenous 

contrast using pulmonary embolism protocol.  All CT scans at this facility use 

one or more dose reduction techniques, viz.: automated exposure control; ma/kV 

adjustment per patient size (including targeted exams where dose is matched to 

indication; i.e. head); or iterative reconstruction technique.

  MIP reconstructed images were created and reviewed.

  Coronal and sagittal reformatted images were created and reviewed.



CONTRAST:

  100 mL of visipaque 320 administered intravenously.



COMPARISON:

  No relevant prior studies available.



FINDINGS:

  Limitations:  Suboptimal timing of bolus.

  Pulmonary arteries:  No definite pulmonary embolism.

  Aorta:  No aneurysm.  No dissection.

  Lungs:  No consolidation.

  Pleural space:  No significant effusion.  No pneumothorax.

  Heart:  Borderline cardiomegaly.  No significant pericardial effusion.

  Bones/joints:  No acute fracture.

  Soft tissues:  Minimal gynecomastia.

  Lymph nodes:  No pathologically enlarged lymph nodes.

  Liver:  Fatty infiltration.



IMPRESSION:     

1.  No definite CT evidence of pulmonary embolism.

2.  Incidental/non-acute findings are described above.

## 2018-03-29 NOTE — RAD
PROCEDURE:  Left Knee Radiographs.



HISTORY:

Pain.



COMPARISON:

None.



FINDINGS:



BONES:

Normal. No fracture. 



JOINTS:

Joint spaces appear preserved. No significant osteoarthritis. 



JOINT EFFUSION:

Small suprapatellar joint effusion. 



OTHER FINDINGS:

None.



IMPRESSION:

No evidence of acute displaced fracture nor dislocation.  Small 

suprapatellar joint effusion present.

## 2018-03-29 NOTE — C.PDOC
History Of Present Illness


Pt c/o left lower chest pain.


Time Seen by Provider: 18 17:11


Chief Complaint (Nursing): Chest Pain


History Per: Patient, Family


Onset/Duration Of Symptoms: Days (5)


Current Symptoms Are (Timing): Still Present


Severity: Moderate


Quality: "Pain"


Modifying Factors: Other Indicated Below


Exacerbating Factors: Turning, Movement, Deep Breathing


Additional History Per: Prior Records





Past Medical History


Reviewed: Historical Data, Nursing Documentation, Vital Signs


Vital Signs: 





 Last Vital Signs











Temp  98.4 F   18 19:32


 


Pulse  82   18 19:32


 


Resp  18   18 19:32


 


BP  100/60   18 19:32


 


Pulse Ox  98   18 19:32














- Medical History


PMH: No Chronic Diseases


Surgical History: No Surg Hx





- [facility] Procedures











DETOXIFICATION SERVICES FOR SUBSTANCE ABUSE TREATMENT (18)


INDIV PSYCHOTHERAPY FOR SUBSTANCE ABUSE TREATMENT, SUPPORT (18)


INDIV PSYCHOTHERAPY FOR SUBSTANCE ABUSE, PSYCHOEDUCATION (18)


MEDICATION MANAGEMENT (18)








Family History: States: Unknown Family Hx





- Social History


Hx Alcohol Use: Yes


Hx Substance Use: No





- Immunization History


Hx Tetanus Toxoid Vaccination: No


Hx Influenza Vaccination: No


Hx Pneumococcal Vaccination: No





Review Of Systems


Except As Marked, All Systems Reviewed And Found Negative.


Constitutional: Negative for: Fever, Weakness


Cardiovascular: Positive for: Chest Pain


Respiratory: Negative for: Shortness of Breath, Hemoptysis


Gastrointestinal: Negative for: Abdominal Pain


Musculoskeletal: Positive for: Other (Left knee pain).  Negative for: Neck Pain

, Back Pain


Skin: Negative for: Rash


Neurological: Negative for: Weakness, Numbness





Physical Exam





- Physical Exam


Appears: Non-toxic, No Acute Distress


Skin: Normal Color, Warm, Dry, No Rash


Head: Atraumatic, Normacephalic


Eye(s): bilateral: Normal Inspection, PERRL, EOMI


Neck: Normal ROM, Supple


Chest: Symmetrical, No Deformity, Tenderness (left chest wall), No Subcutaneous 

Emphysema


Cardiovascular: Rhythm Regular


Respiratory: Normal Breath Sounds, No Accessory Muscle Use


Gastrointestinal/Abdominal: Soft, No Tenderness


Back: No CVA Tenderness


Extremity: Normal ROM, No Pedal Edema, Swelling (left knee with bruising)


Neurological/Psych: Oriented x3, Normal Motor, Normal Sensation





ED Course And Treatment





- Laboratory Results


Result Diagrams: 


 18 17:35





 18 17:35


ECG: Interpreted By Me, Viewed By Me


ECG Rhythm: Sinus Rhythm


ECG Interpretation: No Acute Changes


Rate From EC


O2 Sat by Pulse Oximetry: 98


Pulse Ox Interpretation: Normal





- Other Rad


  ** Left knee x-rays


X-Ray: Viewed By Me, Read By Radiologist


Interpretation: IMPRESSION:  No evidence of acute displaced fracture nor 

dislocation.  Small suprapatellar joint effusion present.





- CT Scan/US


  ** CTA of chest


Other Rad Studies (CT/US): Read By Radiologist, Radiology Report Reviewed


CT/US Interpretation: IMPRESSION:  1.  No definite CT evidence of pulmonary 

embolism.  2.  Incidental/non-acute findings are described above.


Reassessment Condition: Improved





Progress





- Interventions


Interventions:: Observation, Intravenous fluid





- Medications Administered


Intravenous: NSAID





- Data Reviewed


Data Reviewed: Lab, Diagnostic imaging, EKG, Old records





- Patient Status


Patient status: Mostly improved





- Continuity of Care


Discussed patient case with:: Patient, Family-HIPPA compliant, ED Nurse





- Patient Plan


Patient Plan: Discharge, F/U with PCP





Disposition


Counseled Patient/Family Regarding: Studies Performed, Diagnosis, Need For 

Followup, Rx Given





- Disposition


Referrals: 


[facility] [Outside]


Disposition: HOME/ ROUTINE


Disposition Time: 19:56


Condition: IMPROVED


Additional Instructions: 


Follow up in the clinic for further evaluation and treatment. Return to the ER 

if you develop shortness of breath, fever, worsening of symptoms or if you have 

any other concerns. 


Instructions:  Knee Pain (DC), Chest Pain That Is Not Caused by the Heart (DC)


Print Language: Togolese





- Clinical Impression


Clinical Impression: 


 Chest pain, Effusion of left knee

## 2018-03-30 NOTE — RAD
PROCEDURE:  Radiographs of the Chest and Left Ribs.



HISTORY:

Left sided chest pain/tenderness



COMPARISON:

03/17/2018. 



TECHNIQUE:

Frontal radiograph of the chest and multiple oblique radiographs of 

the left ribs were obtained.



FINDINGS:



LEFT RIBS:

No acute rib fracture or focal lesion visualized.



LUNGS:

The lungs are well inflated and clear.



PLEURA:

No pneumothorax or pleural fluid.



CARDIOVASCULAR:

Normal sized heart. No pulmonary vascular congestion.



OTHER FINDINGS:

None.



IMPRESSION:

Clear lungs. No acute left rib fracture.

## 2018-04-02 NOTE — CARD
--------------- APPROVED REPORT --------------





EKG Measurement

Heart [id number]

CT [id number]

[id number]

[id number]

[id number]



<Conclusion>

Normal sinus rhythm

Normal ECG

## 2018-04-06 ENCOUNTER — HOSPITAL ENCOUNTER (EMERGENCY)
Dept: HOSPITAL 31 - C.ER | Age: 33
Discharge: HOME | End: 2018-04-06
Payer: COMMERCIAL

## 2018-04-06 VITALS
RESPIRATION RATE: 20 BRPM | DIASTOLIC BLOOD PRESSURE: 63 MMHG | TEMPERATURE: 97.9 F | HEART RATE: 69 BPM | SYSTOLIC BLOOD PRESSURE: 99 MMHG

## 2018-04-06 VITALS — OXYGEN SATURATION: 97 %

## 2018-04-06 DIAGNOSIS — R07.9: Primary | ICD-10-CM

## 2018-04-06 LAB
BASOPHILS # BLD AUTO: 0.1 K/UL (ref 0–0.2)
BASOPHILS NFR BLD: 0.7 % (ref 0–2)
BUN SERPL-MCNC: 11 MG/DL (ref 9–20)
CALCIUM SERPL-MCNC: 9 MG/DL (ref 8.6–10.4)
EOSINOPHIL # BLD AUTO: 0.1 K/UL (ref 0–0.7)
EOSINOPHIL NFR BLD: 1 % (ref 0–4)
ERYTHROCYTE [DISTWIDTH] IN BLOOD BY AUTOMATED COUNT: 14.4 % (ref 11.5–14.5)
GFR NON-AFRICAN AMERICAN: > 60
HGB BLD-MCNC: 14.1 G/DL (ref 12–18)
LYMPHOCYTES # BLD AUTO: 1.7 K/UL (ref 1–4.3)
LYMPHOCYTES NFR BLD AUTO: 24.7 % (ref 20–40)
MCH RBC QN AUTO: 30.8 PG (ref 27–31)
MCHC RBC AUTO-ENTMCNC: 33.6 G/DL (ref 33–37)
MCV RBC AUTO: 91.7 FL (ref 80–94)
MONOCYTES # BLD: 0.5 K/UL (ref 0–0.8)
MONOCYTES NFR BLD: 6.6 % (ref 0–10)
NEUTROPHILS # BLD: 4.6 K/UL (ref 1.8–7)
NEUTROPHILS NFR BLD AUTO: 67 % (ref 50–75)
NRBC BLD AUTO-RTO: 0.1 % (ref 0–2)
PLATELET # BLD: 330 K/UL (ref 130–400)
PMV BLD AUTO: 10.4 FL (ref 7.2–11.7)
RBC # BLD AUTO: 4.58 MIL/UL (ref 4.4–5.9)
WBC # BLD AUTO: 6.9 K/UL (ref 4.8–10.8)

## 2018-04-06 NOTE — RAD
HISTORY:

Cough.



COMPARISON:

No prior.



TECHNIQUE:

Chest PA and lateral



FINDINGS:



LUNGS:

No active pulmonary disease.



PLEURA:

No significant pleural effusion identified. No pneumothorax apparent.



CARDIOVASCULAR:

Normal.



OSSEOUS STRUCTURES:

Minor chronic anterior stature loss of a few mid -lower thoracic 

segments.



VISUALIZED UPPER ABDOMEN:

Normal.



OTHER FINDINGS:

None.



IMPRESSION:

No active disease.

## 2018-04-06 NOTE — C.PDOC
History Of Present Illness





34 yo male come in for re-evaluation of Left sided sharp intermittent chest 

pain for past 2 weeks. Pt reports, pain is worse at night time, " when lay down

", improves with Naproxyn. Otherwise, pt denies recent illness, fever, chills, 

headache, dizziness, dyspnea, diaphoresis, palpitation, cough, wheezing, abd. 

pain, N/V/D, denies any other active complaints. Pt admits, was seen here on 3/

29/18 due to same complaints, blood work done, imaging performed with normal 

results.





FYI: results from visit on 3/29/18 review -Troponin negative, CTA r/o PE- 

normal study. 





Time Seen by Provider: 04/06/18 10:56


Chief Complaint (Nursing): Chest Pain


History Per: Patient


History/Exam Limitations: no limitations


Onset/Duration Of Symptoms: Intermittent Episodes, Other (2 weeks )


Current Symptoms Are (Timing): Still Present


Quality: Sharp, "Pain"


Exacerbating Factors: Other (laying down )





Past Medical History


Reviewed: Historical Data, Nursing Documentation, Vital Signs


Vital Signs: 


 Last Vital Signs











Temp  97.9 F   04/06/18 13:05


 


Pulse  69   04/06/18 13:05


 


Resp  20   04/06/18 13:05


 


BP  99/63 L  04/06/18 13:05


 


Pulse Ox  98   04/06/18 13:05














- Medical History


PMH: No Chronic Diseases


Surgical History: No Surg Hx





- [facility] Procedures








DETOXIFICATION SERVICES FOR SUBSTANCE ABUSE TREATMENT (03/17/18)


[facility] PSYCHOTHERAPY FOR SUBSTANCE ABUSE TREATMENT, SUPPORT (03/17/18)


[facility] PSYCHOTHERAPY FOR SUBSTANCE ABUSE, PSYCHOEDUCATION (03/17/18)


MEDICATION MANAGEMENT (03/17/18)








Family History: States: Unknown Family Hx





- Social History


Hx Alcohol Use: Yes


Hx Substance Use: No





- Immunization History


Hx Tetanus Toxoid Vaccination: No


Hx Influenza Vaccination: No


Hx Pneumococcal Vaccination: No





Review Of Systems


Constitutional: Negative for: Fever, Chills, Other (diaphoresis)


Cardiovascular: Positive for: Chest Pain (left-sided ).  Negative for: 

Palpitations


Respiratory: Negative for: Cough, Wheezing, Other (dyspnea )


Gastrointestinal: Negative for: Nausea, Vomiting, Abdominal Pain


Neurological: Negative for: Headache, Dizziness





Physical Exam





- Physical Exam


Appears: Well, Non-toxic, No Acute Distress


Skin: Normal Color, Warm, Dry, No Rash


Head: Normacephalic


Eye(s): bilateral: PERRL


Nose: No Flaring, No Discharge


Throat: No Erythema


Neck: Trachea Midline, Supple


Chest: Symmetrical, No Deformity, Tenderness (reproducible Left sided lateral 

chest wall tenderness over pectoris,no edema, no erythema, no palpable 

deformity.)


Cardiovascular: Rhythm Regular, No Murmur, No JVD, Other ((-) carotid bruits B/L

)


Respiratory: No Decreased Breath Sounds, No Accessory Muscle Use, No Stridor, 

No Wheezing


Gastrointestinal/Abdominal: Soft, No Tenderness, No Distention, No Guarding


Back: No CVA Tenderness, No Vertebral Tenderness


Extremity: Normal ROM, No Pedal Edema, No Deformity, No Swelling


Neurological/Psych: Oriented x3, Normal Speech, Normal Motor, Normal Sensation, 

Normal Reflexes





ED Course And Treatment





- Laboratory Results


Result Diagrams: 


 04/06/18 11:55





 04/06/18 11:55


Lab Interpretation: No Changes Compared To Prior Results


ECG: Interpreted By Me, Viewed By Me ()


ECG Rhythm: Sinus Rhythm


ECG Interpretation: Normal, No Changes From Prior


Interpretation Of ECG: SR@74/min, NAD, no acute T wave or ST-T changes.


O2 Sat by Pulse Oximetry: 97


Pulse Ox Interpretation: Normal





- Radiology


CXR: Interpreted by Me, Read By Radiologist


CXR Interpretation: Yes: No Acute Disease





- Other Rad


  ** CXR


X-Ray: Interpreted by Me, Viewed By Me, Read By Radiologist


Interpretation: HISTORY:  Cough.  COMPARISON:  No prior.  TECHNIQUE:  Chest PA 

and lateral.  FINDINGS:  LUNGS:  No active pulmonary disease.  PLEURA:  No 

significant pleural effusion identified. No pneumothorax apparent.  

CARDIOVASCULAR:  Normal.  OSSEOUS STRUCTURES:  Minor chronic anterior stature 

loss of a few mid -lower thoracic segments.  VISUALIZED UPPER ABDOMEN:  Normal.

  OTHER FINDINGS:  None.  IMPRESSION:  No active disease.


Progress Note: On re-eval, pt appears comfortable, not in any apparent 

distress.  AFebrile, hemodynamicaly stable.  Non-toxic.  PulsEOx.  Neck: Supple

, (-) JVD, (-) carotid bruits B/L.  Lungs: CTA B/L, BS equal B/L.  CVS: (+)S1S2

, reg, (-) murmur.  ABd: benign.  Blood work review and appears normal. 

Troponin I- negative, results compare to previous visit from 3/29/18 and 

appears stable/unchanged.  EKG, CXR- no acute finidngs.  Pt has clinical 

findings c/w left sided chest pain, reproducible.  Pt advised and ref. to F/u 

with PMD, Card in 2-3 days for re-eavl.  return if any new changes.





Disposition


Counseled Patient/Family Regarding: Studies Performed, Diagnosis, Need For 

Followup, Rx Given





- Disposition


Referrals: 


[facility] [Outside]


Disposition: HOME/ ROUTINE


Disposition Time: 12:46


Condition: STABLE


Additional Instructions: 


Light duty, avoid physical activity for 1 week





Take medication for pain as need





Follow up with PMDin 1-2 days for re-evaluation.


Return to ED if any worsening or new changes.


Prescriptions: 


Methocarbamol [Robaxin] 500 mg PO TID #14 tab


Instructions:  Chest Pain (DC)


Forms:  [employer] (English)


Print Language: [place]ankan





- Clinical Impression


Clinical Impression: 


 Chest pain








- PA / NP / Resident Statement


MD/DO has reviewed & agrees with the documentation as recorded.





- Scribe Statement


The provider has reviewed the documentation as recorded by the Scribe (Ifrah Saldana)








All medical record entries made by the Scribe were at my direction and 

personally dictated by me. I have reviewed the chart and agree that the record 

accurately reflects my personal performance of the history, physical exam, 

medical decision making, and the department course for this patient. I have 

also personally directed, reviewed, and agree with the discharge instructions 

and disposition.

## 2018-04-07 NOTE — CARD
--------------- APPROVED REPORT --------------





EKG Measurement

Heart [id number]

[id number]

[id number]

[id number]

[id number]



<Conclusion>

Normal sinus rhythm

Normal ECG

## 2022-05-30 NOTE — PCM.PYCHPN
Psychiatric Progress Note





- Psychiatric Progress Note


Patient seen today, length of contact: 15 min


Patient Chief Complaint: 





No complaints.


See for consult follow up


Problems Identified/Issues Discussed: 





He is seen with Ethiopian-speaking nurse, chart reviewed.


Continues to improve


Still perplexed, anxious


Medication Change: Yes (detox changes daily)


Medical Record Reviewed: Yes





Mental Status Examination





- Cognitive Function


Orientation: Person, Place


Memory: Impaired


Attention: Poor


Concentration: Poor


Association: Loose


Fund of Knowledge: Poor





- Mood


Mood: Depressed, Anxious





- Affect


Affect: Blunted (odd)





- Speech


Speech: Soft





- Formal Thought Process


Formal Thought Process: No Impairment





- Suicidal Ideation


Suicidal Ideation: No





- Homicidal Ideation


Homicidal Ideation: No





Goal/Treatment Plan





- Goal/Treatment Plan


Need for Continued Stay: Discharge may exacerbated symptoms, Severe functional 

impairment, Other (medical)


Progress Toward Problem(s) and Goals/Treatment Plan: 





Ativan detox


Remeron for depression, insomnia


As needed medications


Gabapentin for augmentation


All risks, benefits and alternatives of medications, including no medications, 

discussed and the patient understood


Supportive therapy and psychoeducation


MI for abstinence


CBT for relapse prevention


Encourage MAT


Refer to rehab or IOP/AA
Psychiatric Progress Note





- Psychiatric Progress Note


Patient seen today, length of contact: 16 min


Patient Chief Complaint: 





"OK"


Problems Identified/Issues Discussed: 





He is seen with Georgian-speaking nurse, chart reviewed.


He is better than yesterday; not restless, more in control and speaking more 

but with some hesitancy and pausing. He has blunted affect, but denied feeling 

suicidal. Depressed but doesn't look sad, more perplexed. Denied AVH, better 

oriented but not fully. Support given


Medication Change: Yes (detox changes daily)


Medical Record Reviewed: Yes





Mental Status Examination





- Cognitive Function


Orientation: Person, Place


Memory: Impaired


Attention: Poor


Concentration: Poor


Association: Loose


Fund of Knowledge: Poor





- Mood


Mood: Anxious





- Affect


Affect: Blunted (odd)





- Speech


Speech: Soft





- Formal Thought Process


Formal Thought Process: Loosening of associations





- Suicidal Ideation


Suicidal Ideation: No





- Homicidal Ideation


Homicidal Ideation: No





Goal/Treatment Plan





- Goal/Treatment Plan


Need for Continued Stay: Discharge may exacerbated symptoms, Severe functional 

impairment, Other (medical)


Progress Toward Problem(s) and Goals/Treatment Plan: 





Ativan detox


Remeron for depression, insomnia


As needed medications


Gabapentin for augmentation


All risks, benefits and alternatives of medications, including no medications, 

discussed and the patient understood


Supportive therapy and psychoeducation


MI for abstinence


CBT for relapse prevention


Encourage MAT


Refer to rehab or IOP/AA
No